# Patient Record
Sex: FEMALE | Race: WHITE | Employment: UNEMPLOYED | URBAN - METROPOLITAN AREA
[De-identification: names, ages, dates, MRNs, and addresses within clinical notes are randomized per-mention and may not be internally consistent; named-entity substitution may affect disease eponyms.]

---

## 2021-04-15 DIAGNOSIS — Z23 ENCOUNTER FOR IMMUNIZATION: ICD-10-CM

## 2023-05-16 ENCOUNTER — TELEPHONE (OUTPATIENT)
Dept: PRIMARY CARE | Facility: CLINIC | Age: 43
End: 2023-05-16
Payer: COMMERCIAL

## 2023-05-16 DIAGNOSIS — F41.9 ANXIETY: Primary | ICD-10-CM

## 2023-05-16 RX ORDER — DULOXETINE 40 MG/1
40 CAPSULE, DELAYED RELEASE ORAL DAILY
Qty: 30 CAPSULE | Refills: 1 | Status: SHIPPED | OUTPATIENT
Start: 2023-05-16 | End: 2023-06-28 | Stop reason: DRUGHIGH

## 2023-05-16 NOTE — TELEPHONE ENCOUNTER
Pt left a msg stating that she saw  Neuro, and they are looking to have her Bupropion changed to Cymbalta, as there can be an interaction between the Bupropion and the Propanolol.  Pharm is [facility]

## 2023-05-17 NOTE — TELEPHONE ENCOUNTER
I spoke with the pt and relayed the msg with understanding.  I asked her to schedule a follow up for 6 weeks.

## 2023-05-19 DIAGNOSIS — G90.A POTS (POSTURAL ORTHOSTATIC TACHYCARDIA SYNDROME): Primary | ICD-10-CM

## 2023-05-22 ENCOUNTER — TELEPHONE (OUTPATIENT)
Dept: PRIMARY CARE | Facility: CLINIC | Age: 43
End: 2023-05-22
Payer: COMMERCIAL

## 2023-05-22 NOTE — TELEPHONE ENCOUNTER
Pt left a msg stating that her Cymbalta needed a prior auth, and was asking if that was started or what the status is.  Pharm is [facility].

## 2023-05-23 RX ORDER — PROPRANOLOL HYDROCHLORIDE 10 MG/1
TABLET ORAL
Qty: 180 TABLET | Refills: 3 | Status: SHIPPED | OUTPATIENT
Start: 2023-05-23 | End: 2024-02-20 | Stop reason: SDUPTHER

## 2023-06-01 NOTE — TELEPHONE ENCOUNTER
I am faxing to you the info you need for the prior auth for the pt's script of Cymbalta/Duloxetine 40mg.

## 2023-06-26 LAB — URINE CULTURE: ABNORMAL

## 2023-06-28 ENCOUNTER — OFFICE VISIT (OUTPATIENT)
Dept: PRIMARY CARE | Facility: CLINIC | Age: 43
End: 2023-06-28
Payer: COMMERCIAL

## 2023-06-28 VITALS
DIASTOLIC BLOOD PRESSURE: 74 MMHG | SYSTOLIC BLOOD PRESSURE: 114 MMHG | WEIGHT: 147.8 LBS | BODY MASS INDEX: 26.18 KG/M2 | TEMPERATURE: 98.1 F | HEART RATE: 85 BPM | OXYGEN SATURATION: 98 %

## 2023-06-28 DIAGNOSIS — G90.A POTS (POSTURAL ORTHOSTATIC TACHYCARDIA SYNDROME): Primary | ICD-10-CM

## 2023-06-28 DIAGNOSIS — F41.9 ANXIETY: ICD-10-CM

## 2023-06-28 PROCEDURE — 1036F TOBACCO NON-USER: CPT | Performed by: STUDENT IN AN ORGANIZED HEALTH CARE EDUCATION/TRAINING PROGRAM

## 2023-06-28 PROCEDURE — 99214 OFFICE O/P EST MOD 30 MIN: CPT | Performed by: STUDENT IN AN ORGANIZED HEALTH CARE EDUCATION/TRAINING PROGRAM

## 2023-06-28 RX ORDER — DULOXETIN HYDROCHLORIDE 60 MG/1
60 CAPSULE, DELAYED RELEASE ORAL DAILY
Qty: 30 CAPSULE | Refills: 1 | Status: SHIPPED | OUTPATIENT
Start: 2023-06-28 | End: 2023-08-17 | Stop reason: SDUPTHER

## 2023-06-28 RX ORDER — NITROFURANTOIN 25; 75 MG/1; MG/1
100 CAPSULE ORAL EVERY 12 HOURS SCHEDULED
COMMUNITY
Start: 2023-06-24 | End: 2023-11-17 | Stop reason: ALTCHOICE

## 2023-06-28 ASSESSMENT — PATIENT HEALTH QUESTIONNAIRE - PHQ9
2. FEELING DOWN, DEPRESSED OR HOPELESS: MORE THAN HALF THE DAYS
SUM OF ALL RESPONSES TO PHQ QUESTIONS 1-9: 14
SUM OF ALL RESPONSES TO PHQ9 QUESTIONS 1 AND 2: 3
10. IF YOU CHECKED OFF ANY PROBLEMS, HOW DIFFICULT HAVE THESE PROBLEMS MADE IT FOR YOU TO DO YOUR WORK, TAKE CARE OF THINGS AT HOME, OR GET ALONG WITH OTHER PEOPLE: VERY DIFFICULT
6. FEELING BAD ABOUT YOURSELF - OR THAT YOU ARE A FAILURE OR HAVE LET YOURSELF OR YOUR FAMILY DOWN: NEARLY EVERY DAY
3. TROUBLE FALLING OR STAYING ASLEEP OR SLEEPING TOO MUCH: NEARLY EVERY DAY
5. POOR APPETITE OR OVEREATING: NOT AT ALL
9. THOUGHTS THAT YOU WOULD BE BETTER OFF DEAD, OR OF HURTING YOURSELF: NOT AT ALL
7. TROUBLE CONCENTRATING ON THINGS, SUCH AS READING THE NEWSPAPER OR WATCHING TELEVISION: SEVERAL DAYS
4. FEELING TIRED OR HAVING LITTLE ENERGY: NEARLY EVERY DAY
1. LITTLE INTEREST OR PLEASURE IN DOING THINGS: SEVERAL DAYS
8. MOVING OR SPEAKING SO SLOWLY THAT OTHER PEOPLE COULD HAVE NOTICED. OR THE OPPOSITE, BEING SO FIGETY OR RESTLESS THAT YOU HAVE BEEN MOVING AROUND A LOT MORE THAN USUAL: SEVERAL DAYS

## 2023-06-28 ASSESSMENT — ENCOUNTER SYMPTOMS
NUMBNESS: 0
WHEEZING: 0
CHILLS: 0
HEMATURIA: 0
DYSPHORIC MOOD: 1
CONSTIPATION: 0
ABDOMINAL PAIN: 0
PALPITATIONS: 0
NAUSEA: 0
DIARRHEA: 0
SHORTNESS OF BREATH: 0
SLEEP DISTURBANCE: 1
NERVOUS/ANXIOUS: 1
DYSURIA: 0
DIZZINESS: 0
FREQUENCY: 0
FEVER: 0
FATIGUE: 0
COUGH: 0
HEADACHES: 0

## 2023-06-28 ASSESSMENT — ANXIETY QUESTIONNAIRES
GAD7 TOTAL SCORE: 11
2. NOT BEING ABLE TO STOP OR CONTROL WORRYING: MORE THAN HALF THE DAYS
7. FEELING AFRAID AS IF SOMETHING AWFUL MIGHT HAPPEN: NOT AT ALL
6. BECOMING EASILY ANNOYED OR IRRITABLE: NEARLY EVERY DAY
4. TROUBLE RELAXING: MORE THAN HALF THE DAYS
1. FEELING NERVOUS, ANXIOUS, OR ON EDGE: MORE THAN HALF THE DAYS
3. WORRYING TOO MUCH ABOUT DIFFERENT THINGS: MORE THAN HALF THE DAYS
5. BEING SO RESTLESS THAT IT IS HARD TO SIT STILL: NOT AT ALL
IF YOU CHECKED OFF ANY PROBLEMS ON THIS QUESTIONNAIRE, HOW DIFFICULT HAVE THESE PROBLEMS MADE IT FOR YOU TO DO YOUR WORK, TAKE CARE OF THINGS AT HOME, OR GET ALONG WITH OTHER PEOPLE: VERY DIFFICULT

## 2023-06-28 NOTE — PATIENT INSTRUCTIONS
We will send in 60 mg of Cymbalta, use [facility] and it should be much cheaper  We will follow-up in 6 weeks  Please take at least 30 minutes a day to do something you enjoy. To try and relieve stress and anxiety, I recommend yoga, stretching, prayer, or meditation. Exercise is a great way to help with this as well.

## 2023-06-28 NOTE — PROGRESS NOTES
Subjective   Patient ID: Missy Ocasio is a 42 y.o. female who presents for Anxiety and Depression (Follow up).    HPI   She never started cymbalta that was recommended by neuro b/c of cost. She isn't on anything right now.   Current medication: Not on anything right now b/c of interaction with propranolol for POTS.   How they feel: She is pretty anxious and depressed. No SI. Problems sleeping. Appetite is good. More emotional and irritable. No racing thoughts. Exhausted. Motivation comes and goes. Feels overwhelmed.   Side effects: n/a  Previous medications: wellbutrin, lexapro, effexor  Self care: trying to read more. Not working out right now.     Not seeing a counselor.     Review of Systems   Constitutional:  Negative for chills, fatigue and fever.   Respiratory:  Negative for cough, shortness of breath and wheezing.    Cardiovascular:  Negative for chest pain, palpitations and leg swelling.   Gastrointestinal:  Negative for abdominal pain, constipation, diarrhea and nausea.   Genitourinary:  Negative for dysuria, frequency, hematuria and urgency.   Neurological:  Negative for dizziness, numbness and headaches.   Psychiatric/Behavioral:  Positive for dysphoric mood and sleep disturbance. The patient is nervous/anxious.        Objective   /74 (BP Location: Left arm, Patient Position: Sitting, BP Cuff Size: Adult)   Pulse 85   Temp 36.7 °C (98.1 °F) (Temporal)   Wt 67 kg (147 lb 12.8 oz)   SpO2 98%   BMI 26.18 kg/m²     Physical Exam  Constitutional:       Appearance: Normal appearance.   HENT:      Head: Normocephalic and atraumatic.   Eyes:      Extraocular Movements: Extraocular movements intact.      Pupils: Pupils are equal, round, and reactive to light.   Cardiovascular:      Rate and Rhythm: Normal rate and regular rhythm.      Heart sounds: Normal heart sounds. No murmur heard.  Pulmonary:      Effort: Pulmonary effort is normal.      Breath sounds: Normal breath sounds. No wheezing.    Abdominal:      General: Bowel sounds are normal.      Palpations: Abdomen is soft.      Tenderness: There is no abdominal tenderness. There is no guarding.   Musculoskeletal:         General: Normal range of motion.   Skin:     General: Skin is warm and dry.   Neurological:      General: No focal deficit present.      Mental Status: She is alert and oriented to person, place, and time.   Psychiatric:         Mood and Affect: Mood normal. Affect is tearful.         Behavior: Behavior normal.         Assessment/Plan   Problem List Items Addressed This Visit       Anxiety     Patient unable to take Wellbutrin with propranolol for her POTS.  We are sending in 60 mg of Cymbalta which she can get relatively cheap with good Rx and we will follow-up in 6 weeks         Relevant Medications    DULoxetine (Cymbalta) 60 mg DR capsule    POTS (postural orthostatic tachycardia syndrome) - Primary     Follows with neurology and is currently on propranolol                 Patient understands and agrees with treatment plan    Irais Marrero DO   06/28/23

## 2023-06-28 NOTE — ASSESSMENT & PLAN NOTE
Patient unable to take Wellbutrin with propranolol for her POTS.  We are sending in 60 mg of Cymbalta which she can get relatively cheap with good [facility] and we will follow-up in 6 weeks

## 2023-08-17 ENCOUNTER — OFFICE VISIT (OUTPATIENT)
Dept: PRIMARY CARE | Facility: CLINIC | Age: 43
End: 2023-08-17
Payer: COMMERCIAL

## 2023-08-17 VITALS
SYSTOLIC BLOOD PRESSURE: 104 MMHG | OXYGEN SATURATION: 98 % | DIASTOLIC BLOOD PRESSURE: 72 MMHG | WEIGHT: 143.2 LBS | BODY MASS INDEX: 25.37 KG/M2 | TEMPERATURE: 97.2 F | HEART RATE: 71 BPM

## 2023-08-17 DIAGNOSIS — Z00.00 HEALTH MAINTENANCE EXAMINATION: ICD-10-CM

## 2023-08-17 DIAGNOSIS — F41.9 ANXIETY: Primary | ICD-10-CM

## 2023-08-17 PROCEDURE — 99213 OFFICE O/P EST LOW 20 MIN: CPT | Performed by: STUDENT IN AN ORGANIZED HEALTH CARE EDUCATION/TRAINING PROGRAM

## 2023-08-17 PROCEDURE — 1036F TOBACCO NON-USER: CPT | Performed by: STUDENT IN AN ORGANIZED HEALTH CARE EDUCATION/TRAINING PROGRAM

## 2023-08-17 RX ORDER — DULOXETIN HYDROCHLORIDE 60 MG/1
60 CAPSULE, DELAYED RELEASE ORAL DAILY
Qty: 90 CAPSULE | Refills: 1 | Status: SHIPPED | OUTPATIENT
Start: 2023-08-17 | End: 2024-02-20 | Stop reason: SDUPTHER

## 2023-08-17 ASSESSMENT — PATIENT HEALTH QUESTIONNAIRE - PHQ9
8. MOVING OR SPEAKING SO SLOWLY THAT OTHER PEOPLE COULD HAVE NOTICED. OR THE OPPOSITE, BEING SO FIGETY OR RESTLESS THAT YOU HAVE BEEN MOVING AROUND A LOT MORE THAN USUAL: NOT AT ALL
10. IF YOU CHECKED OFF ANY PROBLEMS, HOW DIFFICULT HAVE THESE PROBLEMS MADE IT FOR YOU TO DO YOUR WORK, TAKE CARE OF THINGS AT HOME, OR GET ALONG WITH OTHER PEOPLE: SOMEWHAT DIFFICULT
SUM OF ALL RESPONSES TO PHQ9 QUESTIONS 1 AND 2: 2
1. LITTLE INTEREST OR PLEASURE IN DOING THINGS: SEVERAL DAYS
3. TROUBLE FALLING OR STAYING ASLEEP OR SLEEPING TOO MUCH: SEVERAL DAYS
SUM OF ALL RESPONSES TO PHQ QUESTIONS 1-9: 8
7. TROUBLE CONCENTRATING ON THINGS, SUCH AS READING THE NEWSPAPER OR WATCHING TELEVISION: SEVERAL DAYS
5. POOR APPETITE OR OVEREATING: NOT AT ALL
9. THOUGHTS THAT YOU WOULD BE BETTER OFF DEAD, OR OF HURTING YOURSELF: NOT AT ALL
2. FEELING DOWN, DEPRESSED OR HOPELESS: SEVERAL DAYS
4. FEELING TIRED OR HAVING LITTLE ENERGY: MORE THAN HALF THE DAYS
6. FEELING BAD ABOUT YOURSELF - OR THAT YOU ARE A FAILURE OR HAVE LET YOURSELF OR YOUR FAMILY DOWN: MORE THAN HALF THE DAYS

## 2023-08-17 ASSESSMENT — ANXIETY QUESTIONNAIRES
GAD7 TOTAL SCORE: 5
4. TROUBLE RELAXING: SEVERAL DAYS
7. FEELING AFRAID AS IF SOMETHING AWFUL MIGHT HAPPEN: NOT AT ALL
IF YOU CHECKED OFF ANY PROBLEMS ON THIS QUESTIONNAIRE, HOW DIFFICULT HAVE THESE PROBLEMS MADE IT FOR YOU TO DO YOUR WORK, TAKE CARE OF THINGS AT HOME, OR GET ALONG WITH OTHER PEOPLE: VERY DIFFICULT
3. WORRYING TOO MUCH ABOUT DIFFERENT THINGS: NOT AT ALL
5. BEING SO RESTLESS THAT IT IS HARD TO SIT STILL: NOT AT ALL
1. FEELING NERVOUS, ANXIOUS, OR ON EDGE: SEVERAL DAYS
6. BECOMING EASILY ANNOYED OR IRRITABLE: NEARLY EVERY DAY
2. NOT BEING ABLE TO STOP OR CONTROL WORRYING: NOT AT ALL

## 2023-08-17 ASSESSMENT — ENCOUNTER SYMPTOMS
DIZZINESS: 0
HEADACHES: 0
ABDOMINAL PAIN: 0
WHEEZING: 0
FATIGUE: 0
CHILLS: 0
NUMBNESS: 0
CONSTIPATION: 0
FEVER: 0
NERVOUS/ANXIOUS: 0
PALPITATIONS: 0
NAUSEA: 0
DIARRHEA: 0
HEMATURIA: 0
COUGH: 0
FREQUENCY: 0
SHORTNESS OF BREATH: 0
DYSPHORIC MOOD: 0
DYSURIA: 0

## 2023-08-17 NOTE — PROGRESS NOTES
Subjective   Patient ID: Missy Ocasio is a 42 y.o. female who presents for Anxiety (Medication follow up).    HPI   Current medication: Cymbalta 60 mg  How they feel: She feels much better on it. She has to be off of it for autonomic testing so not taking it until Monday. She is less irritable. Improved mood. Less overwhelmed. Sleeping and eating well  Side effects: Had HA the first 4 days but that went away  Previous medications: Lexapro, wellbutrin  Self care: Has been trying to take more time for herself.       Review of Systems   Constitutional:  Negative for chills, fatigue and fever.   Respiratory:  Negative for cough, shortness of breath and wheezing.    Cardiovascular:  Negative for chest pain, palpitations and leg swelling.   Gastrointestinal:  Negative for abdominal pain, constipation, diarrhea and nausea.   Genitourinary:  Negative for dysuria, frequency, hematuria and urgency.   Neurological:  Negative for dizziness, numbness and headaches.   Psychiatric/Behavioral:  Negative for dysphoric mood. The patient is not nervous/anxious.        Objective   /72 (BP Location: Left arm, Patient Position: Sitting, BP Cuff Size: Adult)   Pulse 71   Temp 36.2 °C (97.2 °F) (Temporal)   Wt 65 kg (143 lb 3.2 oz)   SpO2 98%   BMI 25.37 kg/m²     Physical Exam  Constitutional:       Appearance: Normal appearance.   HENT:      Head: Normocephalic and atraumatic.   Eyes:      Extraocular Movements: Extraocular movements intact.      Pupils: Pupils are equal, round, and reactive to light.   Cardiovascular:      Rate and Rhythm: Normal rate and regular rhythm.      Heart sounds: Normal heart sounds. No murmur heard.  Pulmonary:      Effort: Pulmonary effort is normal.      Breath sounds: Normal breath sounds. No wheezing.   Abdominal:      General: Bowel sounds are normal.      Palpations: Abdomen is soft.      Tenderness: There is no abdominal tenderness. There is no guarding.   Musculoskeletal:         General:  Normal range of motion.   Skin:     General: Skin is warm and dry.   Neurological:      General: No focal deficit present.      Mental Status: She is alert and oriented to person, place, and time.   Psychiatric:         Mood and Affect: Mood normal.         Behavior: Behavior normal.         Assessment/Plan   Problem List Items Addressed This Visit       Anxiety - Primary    Relevant Medications    DULoxetine (Cymbalta) 60 mg DR capsule     Other Visit Diagnoses       Health maintenance examination        Relevant Orders    Lipid Panel    Basic Metabolic Panel    CBC          Doing well on 60 mg of Cymbalta.  We will refill this and follow-up in 6 months for physical with fasting blood work prior       Patient understands and agrees with treatment plan    Irais Marrero, DO   08/17/23

## 2023-11-17 ENCOUNTER — ANCILLARY PROCEDURE (OUTPATIENT)
Dept: RADIOLOGY | Facility: CLINIC | Age: 43
End: 2023-11-17
Payer: COMMERCIAL

## 2023-11-17 ENCOUNTER — OFFICE VISIT (OUTPATIENT)
Dept: PRIMARY CARE | Facility: CLINIC | Age: 43
End: 2023-11-17
Payer: COMMERCIAL

## 2023-11-17 VITALS
WEIGHT: 140.6 LBS | HEART RATE: 89 BPM | OXYGEN SATURATION: 97 % | BODY MASS INDEX: 24.91 KG/M2 | SYSTOLIC BLOOD PRESSURE: 106 MMHG | TEMPERATURE: 97.1 F | DIASTOLIC BLOOD PRESSURE: 76 MMHG

## 2023-11-17 DIAGNOSIS — J40 BRONCHITIS: Primary | ICD-10-CM

## 2023-11-17 DIAGNOSIS — M25.551 RIGHT HIP PAIN: ICD-10-CM

## 2023-11-17 DIAGNOSIS — M25.561 CHRONIC PAIN OF RIGHT KNEE: ICD-10-CM

## 2023-11-17 DIAGNOSIS — G89.29 CHRONIC PAIN OF RIGHT KNEE: ICD-10-CM

## 2023-11-17 PROCEDURE — 73502 X-RAY EXAM HIP UNI 2-3 VIEWS: CPT | Mod: RT,FY

## 2023-11-17 PROCEDURE — 99214 OFFICE O/P EST MOD 30 MIN: CPT | Performed by: INTERNAL MEDICINE

## 2023-11-17 PROCEDURE — 73562 X-RAY EXAM OF KNEE 3: CPT | Mod: RIGHT SIDE | Performed by: RADIOLOGY

## 2023-11-17 PROCEDURE — 73502 X-RAY EXAM HIP UNI 2-3 VIEWS: CPT | Mod: RIGHT SIDE | Performed by: RADIOLOGY

## 2023-11-17 PROCEDURE — 73562 X-RAY EXAM OF KNEE 3: CPT | Mod: RT,FY

## 2023-11-17 PROCEDURE — 1036F TOBACCO NON-USER: CPT | Performed by: INTERNAL MEDICINE

## 2023-11-17 RX ORDER — PREDNISONE 50 MG/1
50 TABLET ORAL DAILY
Qty: 5 TABLET | Refills: 0 | Status: SHIPPED | OUTPATIENT
Start: 2023-11-17 | End: 2024-02-19 | Stop reason: ALTCHOICE

## 2023-11-17 RX ORDER — DOXYCYCLINE HYCLATE 100 MG
100 TABLET ORAL 2 TIMES DAILY
COMMUNITY
Start: 2023-11-12 | End: 2024-02-19 | Stop reason: ALTCHOICE

## 2023-11-17 NOTE — PROGRESS NOTES
Subjective   Patient ID: [id number]   [facility]    Missy Ocasio is a 43 y.o. female who presents for Knee Pain and Hip Pain (Right side ). It has been going on for a while, it bothers more whenever she drives for prolonged period. She is having cough and went to urgent care clinic last Sunday, taking doxycycline but still having cough.        Objective   Visit Vitals  /76 (BP Location: Left arm, Patient Position: Sitting, BP Cuff Size: Adult)   Pulse 89   Temp 36.2 °C (97.1 °F) (Skin)      Review of Systems  All 12 systems reviewed, no other abnormality except that mentioned in HPI.    Physical Exam  Constitutional- no abnormality  ENT- no abnormality  Neck- no swelling  CVS- normal S1 and S2, no murmur.  Pulmonary- clear to auscultation,no rhonchi, no wheezes.  Abdomen- normal- liver and spleen, soft, no distension, bowel sound present.  Neurological- all cranial nerves intact, speech and gait normal, no sensory or motor deficiency.  Musculoskeletal- all pulses are normal, normal movement, no joint swelling.  Skin- no rash, dry.  psychiatry- no suicidal ideation.  Lymph node- no lymphadenopathy      Assessment/Plan   Problem List Items Addressed This Visit    None  Visit Diagnoses       Bronchitis    -  Primary    Relevant Medications    predniSONE (Deltasone) 50 mg tablet    Right hip pain        Relevant Orders    XR hip right 2 or 3 views    Referral to Physical Therapy    Chronic pain of right knee                Tran Hernandez MD

## 2023-11-22 ENCOUNTER — TELEPHONE (OUTPATIENT)
Dept: PRIMARY CARE | Facility: CLINIC | Age: 43
End: 2023-11-22
Payer: COMMERCIAL

## 2023-11-22 NOTE — TELEPHONE ENCOUNTER
----- Message from Tran Hernandez MD sent at 11/22/2023 11:20 AM EST -----  Normal x ray of right hip.  ----- Message -----  From: Interface, Radiology Results In  Sent: 11/18/2023   9:12 AM EST  To: Tran Hernandez MD

## 2023-11-22 NOTE — TELEPHONE ENCOUNTER
----- Message from Tran Hernandez MD sent at 11/22/2023 11:21 AM EST -----  Degenerative disease of the knee.  ----- Message -----  From: Interface, Radiology Results In  Sent: 11/18/2023   9:47 PM EST  To: Tran Hernandez MD

## 2023-12-01 ENCOUNTER — ANCILLARY PROCEDURE (OUTPATIENT)
Dept: RADIOLOGY | Facility: CLINIC | Age: 43
End: 2023-12-01
Payer: COMMERCIAL

## 2023-12-01 DIAGNOSIS — R05.3 CHRONIC COUGH: ICD-10-CM

## 2023-12-01 PROCEDURE — 71046 X-RAY EXAM CHEST 2 VIEWS: CPT | Performed by: RADIOLOGY

## 2023-12-01 PROCEDURE — 71046 X-RAY EXAM CHEST 2 VIEWS: CPT

## 2023-12-06 ENCOUNTER — OFFICE VISIT (OUTPATIENT)
Dept: PRIMARY CARE | Facility: CLINIC | Age: 43
End: 2023-12-06
Payer: COMMERCIAL

## 2023-12-06 VITALS
TEMPERATURE: 97.1 F | DIASTOLIC BLOOD PRESSURE: 72 MMHG | WEIGHT: 140.2 LBS | OXYGEN SATURATION: 97 % | BODY MASS INDEX: 24.84 KG/M2 | HEART RATE: 86 BPM | SYSTOLIC BLOOD PRESSURE: 120 MMHG

## 2023-12-06 DIAGNOSIS — J03.90 ACUTE TONSILLITIS, UNSPECIFIED ETIOLOGY: Primary | ICD-10-CM

## 2023-12-06 PROCEDURE — 1036F TOBACCO NON-USER: CPT | Performed by: INTERNAL MEDICINE

## 2023-12-06 PROCEDURE — 99213 OFFICE O/P EST LOW 20 MIN: CPT | Performed by: INTERNAL MEDICINE

## 2023-12-06 RX ORDER — CEPHALEXIN 500 MG/1
500 CAPSULE ORAL 3 TIMES DAILY
Qty: 30 CAPSULE | Refills: 0 | Status: SHIPPED | OUTPATIENT
Start: 2023-12-06 | End: 2023-12-16

## 2023-12-06 RX ORDER — BENZONATATE 200 MG/1
200 CAPSULE ORAL EVERY 8 HOURS
COMMUNITY
Start: 2023-12-01 | End: 2023-12-06

## 2023-12-06 NOTE — PROGRESS NOTES
Subjective   Patient ID: [id number]   [facility]    Missy Ocasio is a 43 y.o. female who presents for Cough (Went to  12-1-23 ). She was taking doxycycline and finished it on before thanks giving.        Objective   Visit Vitals  /72 (BP Location: Left arm, Patient Position: Sitting, BP Cuff Size: Adult)   Pulse 86   Temp 36.2 °C (97.1 °F) (Skin)      Review of Systems  All 12 systems reviewed, no other abnormality except that mentioned in HPI.    Physical Exam  Constitutional- no abnormality  ENT- tonsils are enlarged.  Neck- no swelling  CVS- normal S1 and S2, no murmur.  Pulmonary- clear to auscultation,no rhonchi, no wheezes.  Abdomen- normal- liver and spleen, soft, no distension, bowel sound present.  Neurological- all cranial nerves intact, speech and gait normal, no sensory or motor deficiency.  Musculoskeletal- all pulses are normal, normal movement, no joint swelling.  Skin- no rash, dry.  psychiatry- no suicidal ideation.  Lymph node- no lymphadenopathy      Assessment/Plan   Problem List Items Addressed This Visit    None  Visit Diagnoses       Acute tonsillitis, unspecified etiology    -  Primary    Relevant Medications    cephalexin (Keflex) 500 mg capsule            Tran Hernandez MD

## 2023-12-12 ENCOUNTER — HOSPITAL ENCOUNTER (OUTPATIENT)
Dept: RADIOLOGY | Facility: HOSPITAL | Age: 43
Discharge: HOME | End: 2023-12-12
Payer: COMMERCIAL

## 2023-12-12 DIAGNOSIS — R92.8 OTHER ABNORMAL AND INCONCLUSIVE FINDINGS ON DIAGNOSTIC IMAGING OF BREAST: ICD-10-CM

## 2023-12-12 PROCEDURE — 77065 DX MAMMO INCL CAD UNI: CPT | Mod: LEFT SIDE | Performed by: RADIOLOGY

## 2023-12-12 PROCEDURE — 77061 BREAST TOMOSYNTHESIS UNI: CPT | Mod: LEFT SIDE | Performed by: RADIOLOGY

## 2023-12-12 PROCEDURE — 77065 DX MAMMO INCL CAD UNI: CPT | Mod: LT

## 2024-02-19 NOTE — PROGRESS NOTES
"Missy Ocasio  presents for her annual wellness exam.    HPI  Specialists seen by patient: Neurology  -will be making a gyn appt    Last pap/cervical cancer screenin  Last mammogram: approximate date 2023 and was normal  Hx of colon ca screening:  n/a  Hx of DXA: n/a  LMP/menstrual cycles: regular, has had some spotting midcycle  Contraception: paragard  Menopause: n/a  History of depression or anxiety:yes  Immunizations: up to date  Diet: Follows a healthy diet  Exercise: Not much right now, is going to start yoga  Alcohol abuse screen:   occasional    How many times in the past year 4 or more drinks in a day? once  Lung cancer screening:   Smoking history: never a smoker  Drug use: No    Anxiety and depression F/up  Current medication: Cymbalta 60 mg  How they feel: Doing great  Side effects: decreased sex drive  Previous medications: lexapro, wellbutrin, effexor  Self care: Reading, doing things for herself        Review of Systems   Constitutional:  Negative for chills, fatigue and fever.   Respiratory:  Negative for cough, shortness of breath and wheezing.    Cardiovascular:  Negative for chest pain, palpitations and leg swelling.   Gastrointestinal:  Negative for abdominal pain, constipation, diarrhea and nausea.   Genitourinary:  Negative for dysuria, frequency, hematuria and urgency.   Neurological:  Negative for dizziness, numbness and headaches.   Psychiatric/Behavioral:  Negative for dysphoric mood. The patient is not nervous/anxious.           Objective    /84 (BP Location: Right arm, Patient Position: Sitting, BP Cuff Size: Adult)   Pulse 90   Temp 36.5 °C (97.7 °F) (Temporal)   Ht 1.65 m (5' 4.96\")   Wt 65.3 kg (144 lb)   SpO2 95%   BMI 23.99 kg/m²     Physical Exam  Constitutional:       General: She is not in acute distress.     Appearance: Normal appearance.   HENT:      Head: Normocephalic and atraumatic.      Right Ear: Tympanic membrane and ear canal normal.      Left Ear: " Tympanic membrane and ear canal normal.      Mouth/Throat:      Mouth: Mucous membranes are moist.      Pharynx: No posterior oropharyngeal erythema.   Eyes:      Extraocular Movements: Extraocular movements intact.      Pupils: Pupils are equal, round, and reactive to light.   Cardiovascular:      Rate and Rhythm: Normal rate and regular rhythm.      Heart sounds: No murmur heard.  Pulmonary:      Effort: Pulmonary effort is normal. No respiratory distress.      Breath sounds: Normal breath sounds. No wheezing.   Abdominal:      General: Bowel sounds are normal.      Palpations: Abdomen is soft.      Tenderness: There is no abdominal tenderness. There is no guarding.   Musculoskeletal:         General: Normal range of motion.      Cervical back: Neck supple.   Skin:     General: Skin is warm and dry.   Neurological:      General: No focal deficit present.      Mental Status: She is alert and oriented to person, place, and time.   Psychiatric:         Mood and Affect: Mood normal.         Behavior: Behavior normal.            Problem List Items Addressed This Visit       Anxiety    Relevant Medications    DULoxetine (Cymbalta) 60 mg DR capsule    POTS (postural orthostatic tachycardia syndrome)    Relevant Medications    propranolol (Inderal) 10 mg tablet     Other Visit Diagnoses       Health maintenance examination    -  Primary    Bilateral impacted cerumen        Relevant Orders    Ear Cerumen Removal    Tinnitus of left ear        Relevant Orders    Referral to ENT             We will obtain fasting blood work.  Results will be communicated to the patient via MyChart or a letter.   We reviewed appropriate preventative health screening guidelines. The patient is  up-to-date on vaccinations, recommended n/a.    We discussed regular aerobic exercise. We discussed proper nutrition and weight control.      Irais Marrero, DO  02/20/24

## 2024-02-20 ENCOUNTER — OFFICE VISIT (OUTPATIENT)
Dept: PRIMARY CARE | Facility: CLINIC | Age: 44
End: 2024-02-20
Payer: COMMERCIAL

## 2024-02-20 VITALS
TEMPERATURE: 97.7 F | SYSTOLIC BLOOD PRESSURE: 120 MMHG | BODY MASS INDEX: 23.99 KG/M2 | HEIGHT: 65 IN | DIASTOLIC BLOOD PRESSURE: 84 MMHG | HEART RATE: 90 BPM | OXYGEN SATURATION: 95 % | WEIGHT: 144 LBS

## 2024-02-20 DIAGNOSIS — H93.12 TINNITUS OF LEFT EAR: ICD-10-CM

## 2024-02-20 DIAGNOSIS — F41.9 ANXIETY: ICD-10-CM

## 2024-02-20 DIAGNOSIS — G90.A POTS (POSTURAL ORTHOSTATIC TACHYCARDIA SYNDROME): ICD-10-CM

## 2024-02-20 DIAGNOSIS — Z00.00 HEALTH MAINTENANCE EXAMINATION: Primary | ICD-10-CM

## 2024-02-20 DIAGNOSIS — H61.23 BILATERAL IMPACTED CERUMEN: ICD-10-CM

## 2024-02-20 PROCEDURE — 99213 OFFICE O/P EST LOW 20 MIN: CPT | Performed by: STUDENT IN AN ORGANIZED HEALTH CARE EDUCATION/TRAINING PROGRAM

## 2024-02-20 PROCEDURE — 1036F TOBACCO NON-USER: CPT | Performed by: STUDENT IN AN ORGANIZED HEALTH CARE EDUCATION/TRAINING PROGRAM

## 2024-02-20 PROCEDURE — 69209 REMOVE IMPACTED EAR WAX UNI: CPT | Performed by: STUDENT IN AN ORGANIZED HEALTH CARE EDUCATION/TRAINING PROGRAM

## 2024-02-20 PROCEDURE — 99396 PREV VISIT EST AGE 40-64: CPT | Performed by: STUDENT IN AN ORGANIZED HEALTH CARE EDUCATION/TRAINING PROGRAM

## 2024-02-20 RX ORDER — DULOXETIN HYDROCHLORIDE 60 MG/1
60 CAPSULE, DELAYED RELEASE ORAL DAILY
Qty: 90 CAPSULE | Refills: 3 | Status: SHIPPED | OUTPATIENT
Start: 2024-02-20

## 2024-02-20 RX ORDER — PROPRANOLOL HYDROCHLORIDE 10 MG/1
10 TABLET ORAL 2 TIMES DAILY
Qty: 180 TABLET | Refills: 3 | Status: SHIPPED | OUTPATIENT
Start: 2024-02-20 | End: 2024-05-24

## 2024-02-20 ASSESSMENT — ENCOUNTER SYMPTOMS
FATIGUE: 0
COUGH: 0
PALPITATIONS: 0
NUMBNESS: 0
CONSTIPATION: 0
DYSURIA: 0
NAUSEA: 0
CHILLS: 0
HEADACHES: 0
FEVER: 0
NERVOUS/ANXIOUS: 0
DIZZINESS: 0
DYSPHORIC MOOD: 0
WHEEZING: 0
HEMATURIA: 0
SHORTNESS OF BREATH: 0
ABDOMINAL PAIN: 0
FREQUENCY: 0
DIARRHEA: 0

## 2024-02-20 ASSESSMENT — PATIENT HEALTH QUESTIONNAIRE - PHQ9
1. LITTLE INTEREST OR PLEASURE IN DOING THINGS: NOT AT ALL
2. FEELING DOWN, DEPRESSED OR HOPELESS: NOT AT ALL
SUM OF ALL RESPONSES TO PHQ9 QUESTIONS 1 AND 2: 0

## 2024-02-20 NOTE — PROGRESS NOTES
Patient ID: Missy Ocasio is a 43 y.o. female.    Ear Cerumen Removal    Date/Time: 2/20/2024 2:05 PM    Performed by: Irais Marrero DO  Authorized by: Irais Marrero DO    Consent:     Consent obtained:  Verbal    Consent given by:  Patient    Risks, benefits, and alternatives were discussed: yes      Risks discussed:  Bleeding, dizziness, incomplete removal, infection, pain and TM perforation  Universal protocol:     Patient identity confirmed:  Verbally with patient  Procedure details:     Location:  R ear and L ear    Procedure type: irrigation      Procedure outcomes: cerumen removed    Post-procedure details:     Inspection:  TM intact    Hearing quality:  Improved    Procedure completion:  Tolerated

## 2024-02-27 ENCOUNTER — OFFICE VISIT (OUTPATIENT)
Dept: NEUROLOGY | Facility: CLINIC | Age: 44
End: 2024-02-27
Payer: COMMERCIAL

## 2024-02-27 VITALS
WEIGHT: 143 LBS | DIASTOLIC BLOOD PRESSURE: 77 MMHG | SYSTOLIC BLOOD PRESSURE: 110 MMHG | HEART RATE: 89 BPM | HEIGHT: 64 IN | BODY MASS INDEX: 24.41 KG/M2

## 2024-02-27 DIAGNOSIS — G90.A POTS (POSTURAL ORTHOSTATIC TACHYCARDIA SYNDROME): Primary | ICD-10-CM

## 2024-02-27 PROCEDURE — 1036F TOBACCO NON-USER: CPT | Performed by: SPECIALIST

## 2024-02-27 PROCEDURE — 99213 OFFICE O/P EST LOW 20 MIN: CPT | Performed by: SPECIALIST

## 2024-02-27 ASSESSMENT — ANXIETY QUESTIONNAIRES
7. FEELING AFRAID AS IF SOMETHING AWFUL MIGHT HAPPEN: NOT AT ALL
6. BECOMING EASILY ANNOYED OR IRRITABLE: NOT AT ALL
IF YOU CHECKED OFF ANY PROBLEMS ON THIS QUESTIONNAIRE, HOW DIFFICULT HAVE THESE PROBLEMS MADE IT FOR YOU TO DO YOUR WORK, TAKE CARE OF THINGS AT HOME, OR GET ALONG WITH OTHER PEOPLE: NOT DIFFICULT AT ALL
5. BEING SO RESTLESS THAT IT IS HARD TO SIT STILL: NOT AT ALL
4. TROUBLE RELAXING: NOT AT ALL
1. FEELING NERVOUS, ANXIOUS, OR ON EDGE: NOT AT ALL
GAD7 TOTAL SCORE: 0
3. WORRYING TOO MUCH ABOUT DIFFERENT THINGS: NOT AT ALL
2. NOT BEING ABLE TO STOP OR CONTROL WORRYING: NOT AT ALL

## 2024-02-27 ASSESSMENT — ENCOUNTER SYMPTOMS
LOSS OF SENSATION IN FEET: 0
DEPRESSION: 0
OCCASIONAL FEELINGS OF UNSTEADINESS: 0

## 2024-02-27 NOTE — PROGRESS NOTES
"The patient was seen in my office for further evaluation of POTS . Since seenl ast she has been doing well, with         - Propranolol 10 mg twice daily ( well tolerated with no side effects).   - Increased salt, fluid intake and use of liquid IV electrolyte powder  - Compression stockings      She has not been experiencing any major symptoms of POTS lately and she is happy about her condition.    She has been treating her anxiety/depression with Cymbalta 60 mg po daily ( well tolerated with no side effects).      Current Outpatient Medications on File Prior to Visit   Medication Sig Dispense Refill    DULoxetine (Cymbalta) 60 mg DR capsule Take 1 capsule (60 mg) by mouth once daily. Do not crush or chew. 90 capsule 3    multivit-minerals/folic acid (MULTIVITAMIN GUMMIES ORAL) Take by mouth.      propranolol (Inderal) 10 mg tablet Take 1 tablet (10 mg) by mouth 2 times a day. 180 tablet 3     No current facility-administered medications on file prior to visit.     Exam:   /77   Pulse 89   Ht 1.626 m (5' 4\")   Wt 64.9 kg (143 lb)   BMI 24.55 kg/m²   In no active distress, at baseline.       Patient Active Problem List   Diagnosis    Anxiety    POTS (postural orthostatic tachycardia syndrome)     Assessment and plan:  Postural Orthostatic Tachycardia Syndrome (POTS) , currently resolved  Management:  - Acknowledge Missy's reported improvement in POTS symptoms with the use of compression socks and medication, noting no new symptoms or exacerbations.  - Continue the prescribed Propranolol at 10 mg twice daily and Cymbalta at 60 mg daily, recognizing their benefits for both POTS and mood symptoms.  - Encourage Missy to monitor her heart rate and blood pressure regularly.  - Discuss the possibility of increasing the  Cymbalta dosage in the future if symptoms of POTS worsen, as an alternative to increasing propranolol.  - Advise Missy on the importance of managing salt and fluid intake, particularly with " seasonal changes.  - No additional testing is deemed necessary at this follow-up.  - Schedule follow-ups as needed based on symptomatology and treatment response.        Cassidy Chang M.D.    Time spent 20 minutes including reviewing medical records includes examining, consulting the patient and discussing .

## 2024-04-04 ENCOUNTER — OFFICE VISIT (OUTPATIENT)
Dept: OTOLARYNGOLOGY | Facility: CLINIC | Age: 44
End: 2024-04-04
Payer: COMMERCIAL

## 2024-04-04 VITALS — WEIGHT: 140 LBS | BODY MASS INDEX: 24.03 KG/M2

## 2024-04-04 DIAGNOSIS — M26.609 TEMPOROMANDIBULAR JOINT DISORDER: Primary | ICD-10-CM

## 2024-04-04 DIAGNOSIS — J34.2 DEVIATED SEPTUM: ICD-10-CM

## 2024-04-04 DIAGNOSIS — J31.0 CHRONIC RHINITIS: ICD-10-CM

## 2024-04-04 DIAGNOSIS — H93.13 TINNITUS OF BOTH EARS: ICD-10-CM

## 2024-04-04 DIAGNOSIS — J35.8 TONSIL STONE: ICD-10-CM

## 2024-04-04 PROCEDURE — 1036F TOBACCO NON-USER: CPT | Performed by: GENERAL PRACTICE

## 2024-04-04 PROCEDURE — 99204 OFFICE O/P NEW MOD 45 MIN: CPT | Performed by: GENERAL PRACTICE

## 2024-04-04 RX ORDER — FLUTICASONE PROPIONATE 50 MCG
2 SPRAY, SUSPENSION (ML) NASAL DAILY
Qty: 16 G | Refills: 2 | Status: SHIPPED | OUTPATIENT
Start: 2024-04-04 | End: 2024-06-04

## 2024-04-04 ASSESSMENT — PATIENT HEALTH QUESTIONNAIRE - PHQ9
SUM OF ALL RESPONSES TO PHQ9 QUESTIONS 1 AND 2: 0
1. LITTLE INTEREST OR PLEASURE IN DOING THINGS: NOT AT ALL
2. FEELING DOWN, DEPRESSED OR HOPELESS: NOT AT ALL

## 2024-04-04 NOTE — PROGRESS NOTES
Otolaryngology - Head and Neck Surgery Outpatient New Patient Visit Note        Assessment/Plan   Problem List Items Addressed This Visit    None  Visit Diagnoses         Codes    Temporomandibular joint disorder    -  Primary M26.609    Deviated septum     J34.2    Tinnitus of both ears     H93.13    Relevant Orders    Referral to Audiology    Chronic rhinitis     J31.0    Relevant Medications    fluticasone (Flonase) 50 mcg/actuation nasal spray    Tonsil stone     J35.8            43yoF with tinnitus in the setting of TMJ.  Uncertain hearing loss per patient.  No otitis on exam.  Some rhinitis which may be contributing to ETD.  Will control for rhinitis with flonase.   Will acquire audiogram.    Discussed conservative tinnitus management  Discussed conservative TMJ management.     Some tonsil calculi, discussed conservative management and controls for irritants such as PND.  Consider future tonsillectomy if persists.      The etiology of temporomandibular joint disorders (TMD) was discussed in detail with patient including: structural issues such as alterations in dental occlusion (the alignment of the upper and lower teeth) that affect maxillomandibular position and function. These issues may or may not be associated with joint trauma, poor posture or cervicogenic etiologies.  Possible psychologic factors include anxiety, depression and PTSD. Multiple other contributing and comorbid factors, including biological, behavioral, environmental, and cognitive disorders which can all contribute to the development of symptoms were also reviewed with the patient.      PLAN:  1) Lifestyle interventions for management of TMJ dysfunction were discussed with the patient today including use of warm compresses, massage of the TMJ joint, and/or use of NSAIDS PRN for pain and intimation and soft chew diet.      Additional options for further evaluation and management of TMD were discussed with the patient today and may include  the following referrals upon patient request:  1) Referral to physical therapy for further evaluation and management of cervicogenic/ postural related issues.  2) Referral to [facility] for evaluation and management with alternative therapeutic modalities such as massage or acupuncture.   3) Referral to dental medicine for further evaluation and management of structural/ mechanical concerns such as malocclusion or bruxism.     Follow up:  -plan for follow up in clinic as needed and after completion of ordered studies    All of the above findings, impressions, treatment planning and follow up plans were discussed with the patient who indicated understanding.  the patient was instructed to contact or return to clinic sooner if symptoms/signs persist or worsen despite the above management.      Colton Azevedo MD  Otolaryngology - Head and Neck Surgery            History Of Present Illness  Missy Ocasio is a 43 y.o. female presenting for evaluation of tinnitus     Reports tinnitus which is more notable in the last few months.   Uncertain if hearing loss.         The paitent reports a history of intermittent, waxing/waning, nonpulsatile, tonal tinnitus which does not interfere with hearing.   The patient denies a history of significant noise exposure due to occupational exposures, industrial noise, etc.     The patient denies sudden changes in hearing.  The patient denies otalgia, otorrhea, vertigo, or facial weakness.    The patient denies a history of otologic surgery or trauma.  The patient denies a history of blast injury, TBI or concussion associated with hearing loss.  The patient denies a family history of significant hearing loss.  The patient reports a history of AOM as a child, but no recent significant history of ear infection.  No reported exposure to known ototoxins (chemotherapeutics, aminoglycosides, loop diurectics, high dose NSAIDs).   No reported history of radiation treatment to the  head/neck.       Reports a history of some chronic congestion, worse in summer with allergic rhinitsi.  Takes claritin PRN.     Reports a history of intermittent tonsil enlargement with URIs causing some dysphagia/odynophagia.   Reports intermittent tonsil stones causing mild irritation.     Reports a history of TMJ.  Uses a bite block at night with some relief.          Past Medical History  She has a past medical history of Anxiety and Depression.    Surgical History  She has a past surgical history that includes [place] tooth extraction.     Social History  She reports that she has never smoked. She has never been exposed to tobacco smoke. She has never used smokeless tobacco. She reports current alcohol use of about 4.0 standard drinks of alcohol per week. She reports that she does not use drugs.    Family History  Family History   Problem Relation Name Age of Onset    Lymphoma Mother      No Known Problems Father      Other (cardiac disorder) Maternal Grandmother      Other (cva) Maternal Grandmother      Other (cardiac disorder) Maternal Grandfather      Other (malignant neoplasm) Paternal Grandfather          Allergies  Patient has no known allergies.    Review of Systems  ROS: Pertinent positives as noted in HPI.    - CONSTITUTIONAL: Does not report weight loss, fever or chills.    - HEENT:   Ear: Does not report  , vertigo, hearing loss, otalgia, otorrhea  Nose: Does not report congestion, rhinorrhea, epistaxis, decreased smell  Throat: Does not report pain, dysphagia, odynophagia  Larynx: Does not report hoarseness,  difficulty breathing, pain with speaking (odynophonia)  Neck: Does not report new masses, pain, swelling  Face: Does not report sinus pain, pressure, swelling, numbness, weakness     - RESPIRATORY: Does not report SOB or cough.    - CV: Does not report palpitations or chest pain.     - GI: Does not report abdominal pain, nausea, vomiting or diarrhea.    - : Does not report dysuria or urinary  frequency.    - MSK: Does not report myalgia or joint pain.    - SKIN: Does not report rash or pruritus.    - NEUROLOGICAL: Does not report headache or syncope.    - PSYCHIATRIC: Does not report recent changes in mood. Does not report anxiety or depression.         Physical Exam:     GENERAL:   Alert & Oriented to person, place and time; Normal affect and appearance. Well developed and well nourished. Conversant & cooperative with examination.     HEAD:   Normocephalic, atraumatic. No sinus tenderness to palpation. Normal parotid bilaterally. Normal facial strength.     NEUROLOGIC:   Cranial nerves II-XII grossly intact, gait WNL. Normal mood and affect.    EYES:   Extraocular movements intact. Pupils equal, round, reactive to light and accommodation. No nystagmus, no ptosis. no scleral injection.    EAR:   Normal auricle. No discomfort or TTP with manipulation.   Handheld otoscopic exam showed normal external auditory canals bilaterally. No purulence or EAC inflammation. Minimal cerumen.   Right tympanic membrane clear and mobile without evidence of perforation, retraction or middle ear effusion.   Left tympanic membrane clear and mobile without evidence of perforation, retraction or middle ear effusion.     NOSE:   No external deformity. No external nasal lesions, lacerations, or scars. Nasal tip symmetrical with normal nasal valves.   Nasal cavity with leftward septum, edematous  mucosa and turbinates. No lesions, masses, purulence or polyps.     OC/OP:   Mucous membranes moist, no masses, lesions or exudates.   Normal tongue, floor of mouth, teeth, gums, lips. Normal posterior pharyngeal wall.    Normal 1+ tonsils without erythema, exudate or obvious calculi     NECK:   No neck masses or thyroid enlargement. Trachea midline. No tenderness to palpation    LYMPHATIC:   No cervical lymphadenopathy.     RESPIRATORY:   Symmetric chest elevation & no retractions. No significant hoarseness. No increased work of  breathing.    CV:   No clubbing or cyanosis. No obvious edema    Skin:   No facial rashes, vesicles or lesions.     Extremities:   No gross abnormalities      Clinic Procedure        Information review:  External sources (notes, imaging, lab results) listed below personally reviewed to aid in medical decision making process.  -  -  -

## 2024-04-20 ENCOUNTER — LAB (OUTPATIENT)
Dept: LAB | Facility: LAB | Age: 44
End: 2024-04-20
Payer: COMMERCIAL

## 2024-04-20 DIAGNOSIS — Z00.00 HEALTH MAINTENANCE EXAMINATION: ICD-10-CM

## 2024-04-20 PROCEDURE — 80048 BASIC METABOLIC PNL TOTAL CA: CPT

## 2024-04-20 PROCEDURE — 80061 LIPID PANEL: CPT

## 2024-04-20 PROCEDURE — 85027 COMPLETE CBC AUTOMATED: CPT

## 2024-04-20 PROCEDURE — 36415 COLL VENOUS BLD VENIPUNCTURE: CPT

## 2024-04-21 LAB
ANION GAP SERPL CALC-SCNC: 10 MMOL/L (ref 10–20)
BUN SERPL-MCNC: 9 MG/DL (ref 6–23)
CALCIUM SERPL-MCNC: 8.9 MG/DL (ref 8.6–10.6)
CHLORIDE SERPL-SCNC: 105 MMOL/L (ref 98–107)
CHOLEST SERPL-MCNC: 164 MG/DL (ref 0–199)
CHOLESTEROL/HDL RATIO: 2.4
CO2 SERPL-SCNC: 29 MMOL/L (ref 21–32)
CREAT SERPL-MCNC: 0.66 MG/DL (ref 0.5–1.05)
EGFRCR SERPLBLD CKD-EPI 2021: >90 ML/MIN/1.73M*2
ERYTHROCYTE [DISTWIDTH] IN BLOOD BY AUTOMATED COUNT: 12.4 % (ref 11.5–14.5)
GLUCOSE SERPL-MCNC: 87 MG/DL (ref 74–99)
HCT VFR BLD AUTO: 40.9 % (ref 36–46)
HDLC SERPL-MCNC: 67.1 MG/DL
HGB BLD-MCNC: 13.4 G/DL (ref 12–16)
LDLC SERPL CALC-MCNC: 83 MG/DL
MCH RBC QN AUTO: 30.5 PG (ref 26–34)
MCHC RBC AUTO-ENTMCNC: 32.8 G/DL (ref 32–36)
MCV RBC AUTO: 93 FL (ref 80–100)
NON HDL CHOLESTEROL: 97 MG/DL (ref 0–149)
NRBC BLD-RTO: 0 /100 WBCS (ref 0–0)
PLATELET # BLD AUTO: 286 X10*3/UL (ref 150–450)
POTASSIUM SERPL-SCNC: 4.5 MMOL/L (ref 3.5–5.3)
RBC # BLD AUTO: 4.39 X10*6/UL (ref 4–5.2)
SODIUM SERPL-SCNC: 139 MMOL/L (ref 136–145)
TRIGL SERPL-MCNC: 68 MG/DL (ref 0–149)
VLDL: 14 MG/DL (ref 0–40)
WBC # BLD AUTO: 5.7 X10*3/UL (ref 4.4–11.3)

## 2024-05-21 DIAGNOSIS — G90.A POTS (POSTURAL ORTHOSTATIC TACHYCARDIA SYNDROME): ICD-10-CM

## 2024-05-24 RX ORDER — PROPRANOLOL HYDROCHLORIDE 10 MG/1
10 TABLET ORAL 2 TIMES DAILY
Qty: 180 TABLET | Refills: 3 | Status: SHIPPED | OUTPATIENT
Start: 2024-05-24

## 2024-06-04 ENCOUNTER — OFFICE VISIT (OUTPATIENT)
Dept: OBSTETRICS AND GYNECOLOGY | Facility: CLINIC | Age: 44
End: 2024-06-04
Payer: COMMERCIAL

## 2024-06-04 VITALS
BODY MASS INDEX: 24.92 KG/M2 | SYSTOLIC BLOOD PRESSURE: 118 MMHG | DIASTOLIC BLOOD PRESSURE: 73 MMHG | HEIGHT: 64 IN | WEIGHT: 146 LBS

## 2024-06-04 DIAGNOSIS — R10.30 LOWER ABDOMINAL PAIN: ICD-10-CM

## 2024-06-04 DIAGNOSIS — N80.9 ENDOMETRIOSIS: ICD-10-CM

## 2024-06-04 DIAGNOSIS — Z01.419 WELL WOMAN EXAM: Primary | ICD-10-CM

## 2024-06-04 PROCEDURE — 99396 PREV VISIT EST AGE 40-64: CPT | Performed by: NURSE PRACTITIONER

## 2024-06-04 PROCEDURE — 87624 HPV HI-RISK TYP POOLED RSLT: CPT | Performed by: NURSE PRACTITIONER

## 2024-06-04 PROCEDURE — 1036F TOBACCO NON-USER: CPT | Performed by: NURSE PRACTITIONER

## 2024-06-04 ASSESSMENT — ENCOUNTER SYMPTOMS
CONSTITUTIONAL NEGATIVE: 1
OCCASIONAL FEELINGS OF UNSTEADINESS: 0
RESPIRATORY NEGATIVE: 0
GASTROINTESTINAL NEGATIVE: 0
NEUROLOGICAL NEGATIVE: 0
HEMATOLOGIC/LYMPHATIC NEGATIVE: 0
DEPRESSION: 0
ALLERGIC/IMMUNOLOGIC NEGATIVE: 0
RESPIRATORY NEGATIVE: 1
EYES NEGATIVE: 0
CONSTITUTIONAL NEGATIVE: 0
HEMATOLOGIC/LYMPHATIC NEGATIVE: 1
ABDOMINAL PAIN: 1
PSYCHIATRIC NEGATIVE: 0
MUSCULOSKELETAL NEGATIVE: 1
CARDIOVASCULAR NEGATIVE: 0
NEUROLOGICAL NEGATIVE: 1
DIFFICULTY URINATING: 1
EYES NEGATIVE: 1
MUSCULOSKELETAL NEGATIVE: 0
CARDIOVASCULAR NEGATIVE: 1
ENDOCRINE NEGATIVE: 0
LOSS OF SENSATION IN FEET: 0
ALLERGIC/IMMUNOLOGIC NEGATIVE: 1
PSYCHIATRIC NEGATIVE: 1

## 2024-06-04 ASSESSMENT — LIFESTYLE VARIABLES
HOW OFTEN DO YOU HAVE A DRINK CONTAINING ALCOHOL: NEVER
SKIP TO QUESTIONS 9-10: 0
HOW OFTEN DO YOU HAVE SIX OR MORE DRINKS ON ONE OCCASION: LESS THAN MONTHLY
AUDIT-C TOTAL SCORE: 0
HOW MANY STANDARD DRINKS CONTAINING ALCOHOL DO YOU HAVE ON A TYPICAL DAY: PATIENT DOES NOT DRINK
SKIP TO QUESTIONS 9-10: 1
AUDIT-C TOTAL SCORE: 4
HOW OFTEN DO YOU HAVE A DRINK CONTAINING ALCOHOL: 2-3 TIMES A WEEK
HOW OFTEN DO YOU HAVE SIX OR MORE DRINKS ON ONE OCCASION: NEVER
HOW MANY STANDARD DRINKS CONTAINING ALCOHOL DO YOU HAVE ON A TYPICAL DAY: 1 OR 2

## 2024-06-04 ASSESSMENT — PAIN SCALES - GENERAL: PAINLEVEL: 0-NO PAIN

## 2024-06-04 NOTE — PROGRESS NOTES
"Haven Rodriguez, APRN-CNP     Subjective   Missy Ocasio is a 43 y.o. female who presents for annual exam.   She has a known history of mild endometriosis.    She has been experiencing an increase in night sweats that seem to be more prevalent with her menstrual cycle.  She also has had spotting midcycle along with lower pelvic pain.  The pain causes her lower abdomen to feel bloated and hard and and is very painful to the touch.  This does not seem to be associated with any change in bowel or bladder.  Past Medical History:   Diagnosis Date    Abnormal Pap smear of cervix     Anxiety     CTS (carpal tunnel syndrome)     Depression     Endometriosis 2015    Female infertility 2013    Migraine 2009     Past Surgical History:   Procedure Laterality Date    COLPOSCOPY      EXPLORATORY LAPAROTOMY      WISDOM TOOTH EXTRACTION         OB History          3    Para   2    Term                AB        Living             SAB        IAB        Ectopic        Multiple        Live Births                   Patient's last menstrual period was 2024.      Review of Systems   Constitutional: Negative.    HENT: Negative.     Eyes: Negative.    Respiratory: Negative.     Cardiovascular: Negative.    Gastrointestinal:  Positive for abdominal pain.   Endocrine: Positive for heat intolerance.   Genitourinary:  Positive for difficulty urinating, menstrual problem, pelvic pain and vaginal discharge.   Musculoskeletal: Negative.    Skin: Negative.    Allergic/Immunologic: Negative.    Neurological: Negative.    Hematological: Negative.    Psychiatric/Behavioral: Negative.     All other systems reviewed and are negative.    Breast: No Complaints   Vaginal: No Complaints        Objective   /73   Ht 1.626 m (5' 4\")   Wt 66.2 kg (146 lb)   LMP 2024   BMI 25.06 kg/m²   Physical Exam  Constitutional:       Appearance: Normal appearance.   Genitourinary:      Vulva and rectum normal.      Right " Labia: No rash or tenderness.     Left Labia: No tenderness or rash.     No vaginal discharge or ulceration.      No vaginal prolapse present.     No vaginal atrophy present.       Right Adnexa: not tender.     Left Adnexa: not tender.     Cervical motion tenderness present.   Breasts:     Right: Normal.      Left: Normal.   Cardiovascular:      Rate and Rhythm: Normal rate.   Pulmonary:      Effort: Pulmonary effort is normal.      Breath sounds: Normal breath sounds.   Abdominal:          Comments: Area of pain located as above   Musculoskeletal:         General: Normal range of motion.   Neurological:      General: No focal deficit present.      Mental Status: She is alert.   Skin:     General: Skin is warm and dry.   Vitals and nursing note reviewed.                 Assessment/Plan   Problem List Items Addressed This Visit    None  Visit Diagnoses         Codes    Well woman exam    -  Primary Z01.419    Relevant Orders    THINPREP PAP TEST    Lower abdominal pain     R10.30    Relevant Orders    US PELVIS TRANSABDOMINAL WITH TRANSVAGINAL        Provider will notify patient after results of pelvic ultrasound are back.  Would consider treatment of endometriosis possibly Myfembree or Orilissa.

## 2024-06-05 ENCOUNTER — HOSPITAL ENCOUNTER (OUTPATIENT)
Dept: RADIOLOGY | Facility: CLINIC | Age: 44
Discharge: HOME | End: 2024-06-05
Payer: COMMERCIAL

## 2024-06-05 DIAGNOSIS — R10.30 LOWER ABDOMINAL PAIN: ICD-10-CM

## 2024-06-05 PROCEDURE — 76856 US EXAM PELVIC COMPLETE: CPT | Performed by: STUDENT IN AN ORGANIZED HEALTH CARE EDUCATION/TRAINING PROGRAM

## 2024-06-05 PROCEDURE — 76856 US EXAM PELVIC COMPLETE: CPT

## 2024-06-05 PROCEDURE — 76830 TRANSVAGINAL US NON-OB: CPT | Performed by: STUDENT IN AN ORGANIZED HEALTH CARE EDUCATION/TRAINING PROGRAM

## 2024-06-07 RX ORDER — RELUGOLIX, ESTRADIOL HEMIHYDRATE, AND NORETHINDRONE ACETATE 40; 1; .5 MG/1; MG/1; MG/1
1 TABLET, FILM COATED ORAL DAILY
Qty: 90 TABLET | Refills: 0 | Status: SHIPPED | OUTPATIENT
Start: 2024-06-07

## 2024-06-10 ENCOUNTER — CLINICAL SUPPORT (OUTPATIENT)
Dept: AUDIOLOGY | Facility: CLINIC | Age: 44
End: 2024-06-10
Payer: COMMERCIAL

## 2024-06-10 DIAGNOSIS — H93.13 SUBJECTIVE TINNITUS OF BOTH EARS: ICD-10-CM

## 2024-06-10 PROCEDURE — 92557 COMPREHENSIVE HEARING TEST: CPT

## 2024-06-10 PROCEDURE — 92567 TYMPANOMETRY: CPT

## 2024-06-10 NOTE — PROGRESS NOTES
AUDIOLOGY ADULT AUDIOMETRIC EVALUATION      Name:  Missy Ocasio   :  1980  Age:  43 y.o.  Date of Evaluation:  6/10/2024    Time: 6303-8024    IMPRESSIONS     Hearing sensitivity within normal limits for 125-8000 Hz in both ears.  DPOAE responses essentially present in both ears.  Word understanding in quiet is excellent in both ears.  Tympanometry indicates normal middle ear pressure and tympanic membrane mobility in both ears.     Today's test results are normal hearing sensitivity.     Amplification needs: Amplification is not warranted at this time.    RECOMMENDATIONS     Continue medical follow up with primary care provider and/or Ears Nose and Throat (ENT) provider as recommended.  Return for audiologic evaluation in conjunction with medical management to monitor hearing sensitivity and assess middle ear status or sooner should concerns arise. The audiology department can be reached at [phone] to schedule an appointment.   Avoid exposure to loud sounds by moving away from the noise, turning down the volume, or wearing proper hearing protection correctly.  Consider use of tinnitus management options, which include but are not limited to the following: sound therapy (use of pleasant or calming sounds that diminish the presence of tinnitus); mindfulness, meditation, and breathing exercises; sleep hygiene; mental health evaluation and formal therapies; psychiatric management of psychopharmaceuticals; dental/orthodontia evaluation; dietary changes (reduction of sodium, caffeine, alcohol); lifestyle changes (exercise, etc.); use of hearing protection and avoidance of loud noise; and formal tinnitus therapies (Tinnitus Retraining Therapy/ TRT, Progressive Tinnitus Management, Tinnitus Activities Treatment, Cognitive Behavioral Therapy).    HISTORY     Reason for visit:  Ms. Ocasio is seen today for an initial audiologic evaluation at the request of Colton Azevedo MD due to tinnitus in both ears, and ear  "itching. History obtained from patient report and chart review.     Change in Hearing: denied  Tinnitus: yes in both ears constant, bothersome, non-pulsatile, and described as ringing/cicada sensation  Otalgia: denied; endorses bilateral ear itching   Aural Pressure/Fullness: denied  Recent Ear Infections/Illness: denied  Otorrhea: denied  Dizziness: denied  History of Ear Surgeries: denied  History of Noise Exposure: denied  Family History of Hearing Loss: Denied  Hearing Aid Use: None  Other Significant History: denied  Falls within the last year: denied    EVALUATION     See scanned audiogram in \"Media\"     TEST RESULTS     Otoscopic Evaluation:  Right Ear: Ear canal clear with identifiable cone of light.  Left Ear: Ear canal clear with identifiable cone of light.    Tympanometry (226 Hz):  Right Ear: Type A, middle ear pressure and tympanic membrane compliance within normal limits.   Left Ear: Type A, middle ear pressure and tympanic membrane compliance within normal limits.     Acoustic Reflexes:   Right Ear: Screened at 1000 Hz, response present.   Left Ear: Screened at 1000 Hz, response present.     Distortion Product Otoacoustic Emissions:  Right Ear: Essentially present. Responses present at 4028-6765 Hz. Response(s) absent at 9070-9812 Hz.  Left Ear:  Essentially present. Responses present at 1975-2716 Hz. Response(s) absent at 9866-6442 Hz.  Present OAEs suggest normal or near cochlear outer hair cell function for corresponding frequency region(s). Absent OAEs with normal middle ear function can be consistent with some degree of hearing loss. Assessment of cochlear outer hair cell function may be impacted by outer or middle ear function.    Test technique:  Pure Tone Audiometry via insert earphones  Reliability:   good    Pure Tone Audiometry:    Right Ear: Hearing sensitivity within normal limits for 125-8000 Hz.    Left Ear: Hearing sensitivity within normal limits for 125-8000 Hz.      Speech " Audiometry:   Right Ear:  Speech Reception Threshold (SRT) was obtained at 0 dB HL. This is in good agreement with three frequency Pure Tone Average. Word Recognition scores were excellent (100%) in quiet when words were presented at 50 dB HL. These results are based on [facility] Auditory Test No.6 (NU-6) Ordered by difficulty (N=10).   Left Ear:  Speech Reception Threshold (SRT) was obtained at 0 dB HL. This is in good agreement with three frequency Pure Tone Average. Word Recognition scores were excellent (100%) in quiet when words were presented at 50 dB HL. These results are based on [facility] Auditory Test No.6 (NU-6) Ordered by difficulty (N=10).     Comparison of today's results with previous test results: No previous results available.         PATIENT EDUCATION     Discussed results and recommendations with Ms. Ocasio. Questions were addressed and the patient was encouraged to contact our department at [phone] should concerns arise.     MAO Pittman, CCC-A  Licensed Clinical Audiologist    Degree of   Hearing Sensitivity dB Range   Within Normal Limits (WNL) 0 - 20   Slight 25   Mild 26 - 40   Moderate 41 - 55   Moderately-Severe 56 - 70   Severe 71 - 90   Profound 91 +     Key   CHL Conductive Hearing Loss   ECV Ear Canal Volume   HA Hearing Aid   NIHL Noise-Induced Hearing Loss   PTA Pure Tone Average   SNHL Sensorineural Hearing Loss   TM Tympanic Membrane   WNL Within Normal Limits

## 2024-06-10 NOTE — PATIENT INSTRUCTIONS
IMPRESSIONS     Hearing sensitivity within normal limits for 125-8000 Hz in both ears.  DPOAE responses essentially present in both ears.  Word understanding in quiet is excellent in both ears.  Tympanometry indicates normal middle ear pressure and tympanic membrane mobility in both ears.     Today's test results are normal hearing sensitivity.     Amplification needs: Amplification is not warranted at this time.    RECOMMENDATIONS     Continue medical follow up with primary care provider and/or Ears Nose and Throat (ENT) provider as recommended.  Return for audiologic evaluation in conjunction with medical management to monitor hearing sensitivity and assess middle ear status or sooner should concerns arise. The audiology department can be reached at [phone] to schedule an appointment.   Avoid exposure to loud sounds by moving away from the noise, turning down the volume, or wearing proper hearing protection correctly.  Consider use of tinnitus management options, which include but are not limited to the following: sound therapy (use of pleasant or calming sounds that diminish the presence of tinnitus); mindfulness, meditation, and breathing exercises; sleep hygiene; mental health evaluation and formal therapies; psychiatric management of psychopharmaceuticals; dental/orthodontia evaluation; dietary changes (reduction of sodium, caffeine, alcohol); lifestyle changes (exercise, etc.); use of hearing protection and avoidance of loud noise; and formal tinnitus therapies (Tinnitus Retraining Therapy/ TRT, Progressive Tinnitus Management, Tinnitus Activities Treatment, Cognitive Behavioral Therapy).

## 2024-06-10 NOTE — Clinical Note
Thank you for referring this patient for an audiologic evaluation. They were seen today for a hearing test. Please see attached encounter note and scanned audiogram for impressions and recommendations.

## 2024-06-11 ENCOUNTER — DOCUMENTATION (OUTPATIENT)
Dept: OBSTETRICS AND GYNECOLOGY | Facility: CLINIC | Age: 44
End: 2024-06-11
Payer: COMMERCIAL

## 2024-06-11 NOTE — PROGRESS NOTES
PA submitted for Stephanie Landaverde  Key: [id number]  PA Case ID #: [id number]  Rx #: [id number]    CaseId:[id number]  Status:Approved  Review Type:Prior Auth  Coverage Start Date:05/12/2024  Coverage End Date:06/11/2025     Patient notified of approval

## 2024-06-19 LAB
CYTOLOGY CMNT CVX/VAG CYTO-IMP: NORMAL
HPV HR 12 DNA GENITAL QL NAA+PROBE: NEGATIVE
HPV HR GENOTYPES PNL CVX NAA+PROBE: NEGATIVE
HPV16 DNA SPEC QL NAA+PROBE: NEGATIVE
HPV18 DNA SPEC QL NAA+PROBE: NEGATIVE
LAB AP CONTRACEPTIVE HISTORY: NORMAL
LAB AP HPV GENOTYPE QUESTION: YES
LAB AP HPV HR: NORMAL
LAB AP PREVIOUS ABNORMAL HISTORY: NORMAL
LABORATORY COMMENT REPORT: NORMAL
LMP START DATE: NORMAL
PATH REPORT.TOTAL CANCER: NORMAL

## 2024-06-21 ENCOUNTER — TELEPHONE (OUTPATIENT)
Dept: OBSTETRICS AND GYNECOLOGY | Facility: CLINIC | Age: 44
End: 2024-06-21

## 2024-06-21 DIAGNOSIS — Z12.31 ENCOUNTER FOR SCREENING MAMMOGRAM FOR MALIGNANT NEOPLASM OF BREAST: ICD-10-CM

## 2024-06-21 NOTE — TELEPHONE ENCOUNTER
Patient tried to schedule Mammogram and was not able to because no orders are in. Please put orders in

## 2024-06-24 ENCOUNTER — APPOINTMENT (OUTPATIENT)
Dept: RADIOLOGY | Facility: HOSPITAL | Age: 44
End: 2024-06-24
Payer: COMMERCIAL

## 2024-06-24 VITALS — WEIGHT: 135 LBS | HEIGHT: 64 IN | BODY MASS INDEX: 23.05 KG/M2

## 2024-06-24 DIAGNOSIS — Z12.31 ENCOUNTER FOR SCREENING MAMMOGRAM FOR MALIGNANT NEOPLASM OF BREAST: ICD-10-CM

## 2024-06-24 PROCEDURE — 77067 SCR MAMMO BI INCL CAD: CPT

## 2024-06-24 PROCEDURE — 77063 BREAST TOMOSYNTHESIS BI: CPT | Performed by: RADIOLOGY

## 2024-06-24 PROCEDURE — 77067 SCR MAMMO BI INCL CAD: CPT | Performed by: RADIOLOGY

## 2024-08-06 DIAGNOSIS — J31.0 CHRONIC RHINITIS: ICD-10-CM

## 2024-08-07 RX ORDER — FLUTICASONE PROPIONATE 50 MCG
2 SPRAY, SUSPENSION (ML) NASAL DAILY
Qty: 16 G | Refills: 2 | Status: SHIPPED | OUTPATIENT
Start: 2024-08-07 | End: 2024-09-06

## 2024-09-16 DIAGNOSIS — N80.9 ENDOMETRIOSIS: ICD-10-CM

## 2024-09-17 RX ORDER — RELUGOLIX, ESTRADIOL HEMIHYDRATE, AND NORETHINDRONE ACETATE 40; 1; .5 MG/1; MG/1; MG/1
1 TABLET, FILM COATED ORAL DAILY
Qty: 90 TABLET | Refills: 0 | Status: SHIPPED | OUTPATIENT
Start: 2024-09-17

## 2024-10-02 ENCOUNTER — APPOINTMENT (OUTPATIENT)
Dept: OBSTETRICS AND GYNECOLOGY | Facility: CLINIC | Age: 44
End: 2024-10-02
Payer: COMMERCIAL

## 2024-10-02 VITALS — SYSTOLIC BLOOD PRESSURE: 119 MMHG | BODY MASS INDEX: 24.89 KG/M2 | WEIGHT: 145 LBS | DIASTOLIC BLOOD PRESSURE: 83 MMHG

## 2024-10-02 DIAGNOSIS — R45.4 IRRITABILITY: ICD-10-CM

## 2024-10-02 DIAGNOSIS — R10.2 FEMALE PELVIC PAIN: ICD-10-CM

## 2024-10-02 DIAGNOSIS — N80.9 ENDOMETRIOSIS: Primary | ICD-10-CM

## 2024-10-02 PROCEDURE — 99214 OFFICE O/P EST MOD 30 MIN: CPT | Performed by: OBSTETRICS & GYNECOLOGY

## 2024-10-02 PROCEDURE — 1036F TOBACCO NON-USER: CPT | Performed by: OBSTETRICS & GYNECOLOGY

## 2024-10-02 RX ORDER — NORETHINDRONE ACETATE AND ETHINYL ESTRADIOL .02; 1 MG/1; MG/1
1 TABLET ORAL DAILY
Qty: 84 TABLET | Refills: 3 | Status: SHIPPED | OUTPATIENT
Start: 2024-10-02 | End: 2025-10-02

## 2024-10-02 ASSESSMENT — ENCOUNTER SYMPTOMS
DEPRESSION: 0
OCCASIONAL FEELINGS OF UNSTEADINESS: 0
LOSS OF SENSATION IN FEET: 0

## 2024-10-02 ASSESSMENT — PAIN SCALES - GENERAL: PAINLEVEL: 0-NO PAIN

## 2024-10-02 NOTE — PROGRESS NOTES
"SUBJECTIVE    44 y.o.  Having periods female presents for   Chief Complaint   Patient presents with    Consult     Pt here to consult treatment of endometriosis  Pt has hx of endometriosis and is currently taking myfembree but would like to discuss other options due to side effects while on this, pt c/o mood irritability  Last pap 2024 WNL  Mamm 2024      Pt presents to discuss symptoms related to her dx of endometriosis. She was diagnosed with endometriosis in  during a laparoscopy performed by [facility].  At that time she was not having pelvic pain (incidental finding).  Her recollection was that it was \"mild.\"      Throughout the years she has had intermittent abdominal pain. That was never treated.  This year, though, she started having more significant pain that impacted normal activities.  Pain is central in her lower pelvis.  Pain seems to be the worst at the end of her period, although it can occur throughout the month.  She has a copper IUD that was placed about 5 years ago.  At her last visit in  she saw Haven Rodriguez-- an ultrasound was ordered which showed a normal sized uterus, appropriately placed IUD, and no adnexal masses. At that point she was started on Myfembree.      Since starting Myfembree, the patient reports she has had continued abdominal pain-- her pain was initially improved but has since returned.  Overall, though, her pain is improved. Her most concerning symptom, though, has been increase in mood changes and irritability. Irritability is the largest symptom-- very impatient, especially with her children.  Sleep patterns are unchanged.  She has had hot flashes (although she did have these prior to Myfembree).      Pt denies dysmenorrhea.    OB/GYN History  Patient's last menstrual period was 2024 (approximate).    Social History     Substance and Sexual Activity   Sexual Activity Yes    Partners: Male    Birth control/protection: I.U.D.       OB History    " Para Term  AB Living   3 2 0         SAB IAB Ectopic Multiple Live Births                  # Outcome Date GA Lbr Gurwinder/2nd Weight Sex Type Anes PTL Lv   3             2 Para            1 Para                Past Medical History  She has a past medical history of Abnormal Pap smear of cervix (), Anxiety, CTS (carpal tunnel syndrome) (), Depression, Endometriosis (), Female infertility (), and Migraine ().    Surgical History  She has a past surgical history that includes [place] tooth extraction; Colposcopy (); and Exploratory laparotomy ().     Social History  She reports that she has never smoked. She has never been exposed to tobacco smoke. She has never used smokeless tobacco. She reports current alcohol use of about 4.0 standard drinks of alcohol per week. She reports that she does not use drugs.    Screenings  Social Determinants of Health     Tobacco Use: Low Risk  (10/2/2024)    Patient History     Smoking Tobacco Use: Never     Smokeless Tobacco Use: Never     Passive Exposure: Never   Alcohol Use: Alcohol Misuse (2024)    AUDIT-C     Frequency of Alcohol Consumption: 2-3 times a week     Average Number of Drinks: 1 or 2     Frequency of Binge Drinking: Less than monthly   Financial Resource Strain: Not on file   Food Insecurity: Not on file   Transportation Needs: Not on file   Physical Activity: Not on file   Stress: No Stress Concern Present (2024)    [employer] - Occupational Stress Questionnaire     Feeling of Stress : Not at all   Social Connections: Not on file   Intimate Partner Violence: Not on file   Depression: Not at risk (2024)    PHQ-2     PHQ-2 Score: 0   Housing Stability: Not on file   Utilities: Not on file   Digital Equity: Not on file   Health Literacy: Not on file         OBJECTIVE  Vitals:    10/02/24 1109   BP: 119/83   Weight: 65.8 kg (145 lb)     Body mass index is 24.89 kg/m².     Chaperone: Present:  NA  OBGyn Exam deferrred    ASSESSMENT & PLAN  Problem List Items Addressed This Visit    None  Visit Diagnoses       Endometriosis    -  Primary    Relevant Medications    norethindrone ac-eth estradioL (Loestrin 1/20, 21,) 1-20 mg-mcg tablet    Female pelvic pain        Relevant Medications    norethindrone ac-eth estradioL (Loestrin 1/20, 21,) 1-20 mg-mcg tablet    Irritability        Relevant Medications    norethindrone ac-eth estradioL (Loestrin 1/20, 21,) 1-20 mg-mcg tablet            Follow up: We discussed the traditional management of endometriosis pain, typically starting with a RALPH or POP (pt does not have any contraindication to a RALPH).  Given pt's side effects from Myfembree, will stop that medication and begin a low-dose OCP.  Also discussed additional therapy (potentially changing her copper IUD to a Mirena; elagolix, or laparoscopy). Pt in agreement with plan. RTO 3 months.    Andrzej Hickman MD  Obstetrics & Gynecology  10/02/24

## 2025-01-08 ENCOUNTER — APPOINTMENT (OUTPATIENT)
Dept: OBSTETRICS AND GYNECOLOGY | Facility: CLINIC | Age: 45
End: 2025-01-08
Payer: COMMERCIAL

## 2025-01-08 VITALS
HEART RATE: 78 BPM | WEIGHT: 144.8 LBS | BODY MASS INDEX: 24.85 KG/M2 | DIASTOLIC BLOOD PRESSURE: 84 MMHG | SYSTOLIC BLOOD PRESSURE: 121 MMHG

## 2025-01-08 DIAGNOSIS — N80.9 ENDOMETRIOSIS: Primary | ICD-10-CM

## 2025-01-08 DIAGNOSIS — R10.2 FEMALE PELVIC PAIN: ICD-10-CM

## 2025-01-08 DIAGNOSIS — N94.6 DYSMENORRHEA: ICD-10-CM

## 2025-01-08 PROCEDURE — 99214 OFFICE O/P EST MOD 30 MIN: CPT | Performed by: OBSTETRICS & GYNECOLOGY

## 2025-01-08 PROCEDURE — 1036F TOBACCO NON-USER: CPT | Performed by: OBSTETRICS & GYNECOLOGY

## 2025-01-08 ASSESSMENT — ENCOUNTER SYMPTOMS
GASTROINTESTINAL NEGATIVE: 0
RESPIRATORY NEGATIVE: 0
ALLERGIC/IMMUNOLOGIC NEGATIVE: 0
PSYCHIATRIC NEGATIVE: 0
NEUROLOGICAL NEGATIVE: 0
MUSCULOSKELETAL NEGATIVE: 0
ENDOCRINE NEGATIVE: 0
CONSTITUTIONAL NEGATIVE: 0
EYES NEGATIVE: 0
CARDIOVASCULAR NEGATIVE: 0
HEMATOLOGIC/LYMPHATIC NEGATIVE: 0

## 2025-01-08 NOTE — PROGRESS NOTES
"SUBJECTIVE    44 y.o.  Having periods female presents for   Chief Complaint   Patient presents with    Follow-up     DISCUSS MEDICATION       Pt here for follow up. Was seen in 10/2024 for a h/o endometriosis with increasing pelvic pain/dysmenorrhea and had been started on Myfembree (pt had a copper IUD) by Haven Rodriguez.  This medication caused irritation/mood changes although her pain was improved.  At that time we swapped her to LoEstrin. She also had a pelvic ultrasound in 2024 showing a normal uterus and appropriately located IUD.    Pt reports things are \"OK.\"  Menses are lighter (only 4 days long instead of 7-8).  Still has dysmenorrhea at a level of 5/10 (7-8/10 if someone touches her).  Help with heating pads.  Takes OTC Ibuprofen or Aleve which does not help as much.  She can also have some breakthrough bleeding late in her pill pack (enough to wear a liner).    OB/GYN History  No LMP recorded.    Social History     Substance and Sexual Activity   Sexual Activity Yes    Partners: Male    Birth control/protection: I.U.D.       OB History    Para Term  AB Living   3 2 1   1 2   SAB IAB Ectopic Multiple Live Births   1       2      # Outcome Date GA Lbr Gurwinder/2nd Weight Sex Type Anes PTL Lv   3 Para 2018     Vag-Spont   CARINA   2 Term 2016     Vag-Spont   CARINA   1 SAB 2015               Past Medical History  She has a past medical history of Abnormal Pap smear of cervix (), Anxiety, CTS (carpal tunnel syndrome) (), Depression, Endometriosis (), Female infertility (), and Migraine ().    Surgical History  She has a past surgical history that includes [place] tooth extraction; Colposcopy (); and Pelvic laparoscopy ().     Social History  She reports that she has never smoked. She has never been exposed to tobacco smoke. She has never used smokeless tobacco. She reports current alcohol use of about 4.0 standard drinks of alcohol per week. She reports that she does not " use drugs.    Medications:    Current Outpatient Medications:     DULoxetine (Cymbalta) 60 mg DR capsule, Take 1 capsule (60 mg) by mouth once daily. Do not crush or chew., Disp: 90 capsule, Rfl: 3    fluticasone (Flonase) 50 mcg/actuation nasal spray, Administer 2 sprays into each nostril once daily. Shake gently. Before first use, prime pump. After use, clean tip and replace cap., Disp: 16 g, Rfl: 2    multivit-minerals/folic acid (MULTIVITAMIN GUMMIES ORAL), Take by mouth., Disp: , Rfl:     norethindrone ac-eth estradioL (Loestrin 1/20, 21,) 1-20 mg-mcg tablet, Take 1 tablet by mouth once daily., Disp: 84 tablet, Rfl: 3    propranolol (Inderal) 10 mg tablet, Take 1 tablet (10 mg) by mouth 2 times a day., Disp: 180 tablet, Rfl: 3    Screenings  Social Drivers of Health     Tobacco Use: Low Risk  (1/8/2025)    Patient History     Smoking Tobacco Use: Never     Smokeless Tobacco Use: Never     Passive Exposure: Never   Alcohol Use: Alcohol Misuse (6/4/2024)    AUDIT-C     Frequency of Alcohol Consumption: 2-3 times a week     Average Number of Drinks: 1 or 2     Frequency of Binge Drinking: Less than monthly   Financial Resource Strain: Not on file   Food Insecurity: Not on file   Transportation Needs: Not on file   Physical Activity: Not on file   Stress: No Stress Concern Present (6/4/2024)    [employer] [place] [employer] - Occupational Stress Questionnaire     Feeling of Stress : Not at all   Social Connections: Not on file   Intimate Partner Violence: Not on file   Depression: Not at risk (4/4/2024)    PHQ-2     PHQ-2 Score: 0   Housing Stability: Not on file   Utilities: Not on file   Digital Equity: Not on file   Health Literacy: Not on file         OBJECTIVE  Vitals:    01/08/25 0959   BP: 121/84   BP Location: Left arm   Patient Position: Sitting   Pulse: 78   Weight: 65.7 kg (144 lb 12.8 oz)     Body mass index is 24.85 kg/m².     Chaperone: Present: NA  OBGyn Exam deferred    ASSESSMENT &  PLAN  Problem List Items Addressed This Visit    None  Visit Diagnoses       Endometriosis    -  Primary    Female pelvic pain        Dysmenorrhea              Follow up: We reviewed options for additional management-- these included changing to a higher estrogen OCP (to avoid the BTB), exchanging copper IUD to progesterone IUD, prescription NSAIDs, and laparoscopy. Given likelihood of copper IUD contributing to sx, will first opt to IUD exchange. If symptoms do not improve will move towards laparoscopy.  Pt ill schedule for IUD exchange.    Andrzej Hickman MD  Obstetrics & Gynecology  01/08/25

## 2025-01-27 ENCOUNTER — APPOINTMENT (OUTPATIENT)
Dept: OBSTETRICS AND GYNECOLOGY | Facility: CLINIC | Age: 45
End: 2025-01-27
Payer: COMMERCIAL

## 2025-01-28 ASSESSMENT — ENCOUNTER SYMPTOMS
HEMATOCHEZIA: 0
ABDOMINAL PAIN: 1
HEMATURIA: 0
FREQUENCY: 0
WEIGHT LOSS: 0
CONSTIPATION: 0
FLATUS: 0
NAUSEA: 0
BELCHING: 0
MYALGIAS: 0
VOMITING: 0
DIARRHEA: 0
ARTHRALGIAS: 1
FEVER: 0
DYSURIA: 0
ANOREXIA: 0
HEADACHES: 0

## 2025-01-29 ENCOUNTER — APPOINTMENT (OUTPATIENT)
Dept: OBSTETRICS AND GYNECOLOGY | Facility: CLINIC | Age: 45
End: 2025-01-29
Payer: COMMERCIAL

## 2025-01-29 VITALS
WEIGHT: 145 LBS | SYSTOLIC BLOOD PRESSURE: 124 MMHG | DIASTOLIC BLOOD PRESSURE: 83 MMHG | BODY MASS INDEX: 24.75 KG/M2 | HEIGHT: 64 IN

## 2025-01-29 DIAGNOSIS — N80.9 ENDOMETRIOSIS: ICD-10-CM

## 2025-01-29 DIAGNOSIS — Z30.430 ENCOUNTER FOR INTRAUTERINE DEVICE PLACEMENT: Primary | ICD-10-CM

## 2025-01-29 DIAGNOSIS — Z30.432 ENCOUNTER FOR IUD REMOVAL: ICD-10-CM

## 2025-01-29 DIAGNOSIS — N94.6 DYSMENORRHEA: ICD-10-CM

## 2025-01-29 LAB — PREGNANCY TEST URINE, POC: NEGATIVE

## 2025-01-29 PROCEDURE — 58301 REMOVE INTRAUTERINE DEVICE: CPT | Performed by: OBSTETRICS & GYNECOLOGY

## 2025-01-29 PROCEDURE — 58300 INSERT INTRAUTERINE DEVICE: CPT | Performed by: OBSTETRICS & GYNECOLOGY

## 2025-01-29 PROCEDURE — 81025 URINE PREGNANCY TEST: CPT | Performed by: OBSTETRICS & GYNECOLOGY

## 2025-01-29 ASSESSMENT — PATIENT HEALTH QUESTIONNAIRE - PHQ9
2. FEELING DOWN, DEPRESSED OR HOPELESS: NOT AT ALL
SUM OF ALL RESPONSES TO PHQ9 QUESTIONS 1 & 2: 0
1. LITTLE INTEREST OR PLEASURE IN DOING THINGS: NOT AT ALL

## 2025-01-29 ASSESSMENT — PAIN SCALES - GENERAL: PAINLEVEL_OUTOF10: 0-NO PAIN

## 2025-01-29 NOTE — PROGRESS NOTES
IUD Removal and Insertion Procedure Note    Indications:  endometriosis, AUB, and dysmenorrhea    Procedure Details   Urine pregnancy test was done and negative.  The risks (including infection, bleeding, pain, and uterine perforation) and benefits of the procedure were explained to the patient and Written informed consent was obtained.      Procedure: Mirena (IUD) placement; IUD removal  Speculum placed and cervix visualized. The strings of the pre-existing Parguard were grasped and the IUD removed without incident. Cervix cleansed with Betadine. Uterus sounded to 7 cm.   Local anesthesia:   none  Tenaculum used:  Yes, single tooth, anterior  IUD inserted without difficulty.   String visible and trimmed to 3cm.  Patient tolerated procedure well.    Condition:  Stable    Complications:  None    Plan:  The patient was advised to call for any fever or for prolonged or severe pain or bleeding. She was advised to use NSAID as needed for mild to moderate pain.     Andrzej Hickman MD

## 2025-02-20 ASSESSMENT — PROMIS GLOBAL HEALTH SCALE
RATE_GENERAL_HEALTH: GOOD
RATE_AVERAGE_PAIN: 3
RATE_QUALITY_OF_LIFE: VERY GOOD
RATE_SOCIAL_SATISFACTION: GOOD
EMOTIONAL_PROBLEMS: ALWAYS
RATE_AVERAGE_FATIGUE: MODERATE
CARRYOUT_SOCIAL_ACTIVITIES: GOOD
RATE_PHYSICAL_HEALTH: GOOD
CARRYOUT_PHYSICAL_ACTIVITIES: MOSTLY
RATE_MENTAL_HEALTH: FAIR

## 2025-02-23 ASSESSMENT — ENCOUNTER SYMPTOMS
WHEEZING: 0
FREQUENCY: 0
CONSTIPATION: 0
DYSPHORIC MOOD: 0
DIARRHEA: 0
FATIGUE: 0
CHILLS: 0
HEMATURIA: 0
PALPITATIONS: 0
DIZZINESS: 0
NERVOUS/ANXIOUS: 0
FEVER: 0
HEADACHES: 0
ABDOMINAL PAIN: 0
NUMBNESS: 0
SHORTNESS OF BREATH: 0
NAUSEA: 0
DYSURIA: 0
COUGH: 0

## 2025-02-23 NOTE — PROGRESS NOTES
Missy Ocasio  presents for her annual wellness exam.    HPI  Specialists seen by patient: Neurology: POTS  -GYN  -dentist  -derm    Last pap/cervical cancer screenin  Last mammogram: , ordered by GYN  Hx of colon ca screening:  n/a  Hx of DXA: n/a  History of depression or anxiety:yes  Immunizations: up to date  Diet: Follows a healthy diet  Exercise: Not much right now  Alcohol abuse screen:   3-4 drinks per week  How many times in the past year 4 or more drinks in a day? once  Lung cancer screening:   Smoking history: never a smoker  Drug use: No   PHQ-9: 4  HCPOA: yes    Anxiety and depression F/up  Current medication: Cymbalta 60 mg  How they feel: Doing great outside of situational issues  Side effects: decreased sex drive  Previous medications: lexapro, wellbutrin, effexor  Self care: Reading, doing things for herself    Is on propranolol 10 mg twice daily for POTS    Having discomfort in L breast for 1 week. Comes and goes. No lumps or bumps. No discharge. No changes in the nipple. 12 oclock and axillary area. No family hx of breast cancer.  Mom does have lymphoma.  She did just have a recent upper respiratory infection    Due for fasting blood work    Fingers lock up when cutting things. Fluid intake is good. Potassium intake is good. Not painful. No swelling.     Inside of nose irritated for months. Blood when blowing her nose. Saline helps. Swabbing with vaseline.     Review of Systems   Constitutional:  Negative for chills, fatigue and fever.   Respiratory:  Negative for cough, shortness of breath and wheezing.    Cardiovascular:  Negative for chest pain, palpitations and leg swelling.   Gastrointestinal:  Negative for abdominal pain, constipation, diarrhea and nausea.   Genitourinary:  Negative for dysuria, frequency, hematuria and urgency.   Neurological:  Negative for dizziness, numbness and headaches.   Psychiatric/Behavioral:  Negative for dysphoric mood. The patient is not  "nervous/anxious.           Objective    /64 (BP Location: Left arm, Patient Position: Sitting, BP Cuff Size: Adult)   Pulse 55   Temp 36.4 °C (97.5 °F) (Skin)   Ht 1.626 m (5' 4\")   Wt 64.5 kg (142 lb 3.2 oz)   SpO2 98%   BMI 24.41 kg/m²     Physical Exam  Constitutional:       General: She is not in acute distress.     Appearance: Normal appearance.   HENT:      Head: Normocephalic and atraumatic.      Right Ear: Tympanic membrane and ear canal normal.      Left Ear: Tympanic membrane and ear canal normal.      Mouth/Throat:      Mouth: Mucous membranes are moist.      Pharynx: No posterior oropharyngeal erythema.   Eyes:      Extraocular Movements: Extraocular movements intact.      Pupils: Pupils are equal, round, and reactive to light.   Cardiovascular:      Rate and Rhythm: Normal rate and regular rhythm.      Heart sounds: No murmur heard.  Pulmonary:      Effort: Pulmonary effort is normal. No respiratory distress.      Breath sounds: Normal breath sounds. No wheezing.   Chest:   Breasts:     Right: Normal.      Left: Tenderness present. No swelling, bleeding, inverted nipple, mass, nipple discharge or skin change.       Abdominal:      General: Bowel sounds are normal.      Palpations: Abdomen is soft.      Tenderness: There is no abdominal tenderness. There is no guarding.   Musculoskeletal:         General: Normal range of motion.      Right hand: Normal.      Left hand: Normal.      Cervical back: Neck supple.   Lymphadenopathy:      Upper Body:      Left upper body: No supraclavicular, axillary or pectoral adenopathy.   Skin:     General: Skin is warm and dry.   Neurological:      General: No focal deficit present.      Mental Status: She is alert and oriented to person, place, and time.   Psychiatric:         Mood and Affect: Mood normal.         Behavior: Behavior normal.            Problem List Items Addressed This Visit       Anxiety    Relevant Medications    DULoxetine (Cymbalta) 60 mg " DR capsule    POTS (postural orthostatic tachycardia syndrome)    Relevant Medications    propranolol (Inderal) 10 mg tablet     Other Visit Diagnoses       Health maintenance examination    -  Primary    Relevant Orders    Lipid Panel    Comprehensive Metabolic Panel    CBC    Cramping of hands        Relevant Orders    Thyroid Stimulating Hormone    T4, free    Magnesium    Breast pain, left        Relevant Orders    BI mammo left diagnostic    BI US breast complete left    Depression screening                   We will obtain fasting blood work.  Results will be communicated to the patient via MyChart or a letter.   We reviewed appropriate preventative health screening guidelines.    We discussed regular aerobic exercise. We discussed proper nutrition and weight control.    Diagnostic mammogram and ultrasound of the left breast for her left breast pain    Will check magnesium and thyroid in addition to regular blood work for her hand cramping.  Can consider EMG.    Anxiety and depression well-controlled, will continue current medications.    5-10 minutes were spent screening for depression using PHQ-2/PHQ-9 as documented in the chart      Irais Marrero DO  02/24/25

## 2025-02-24 ENCOUNTER — APPOINTMENT (OUTPATIENT)
Dept: PRIMARY CARE | Facility: CLINIC | Age: 45
End: 2025-02-24
Payer: COMMERCIAL

## 2025-02-24 VITALS
BODY MASS INDEX: 24.28 KG/M2 | DIASTOLIC BLOOD PRESSURE: 64 MMHG | HEIGHT: 64 IN | OXYGEN SATURATION: 98 % | HEART RATE: 55 BPM | SYSTOLIC BLOOD PRESSURE: 110 MMHG | TEMPERATURE: 97.5 F | WEIGHT: 142.2 LBS

## 2025-02-24 DIAGNOSIS — G90.A POTS (POSTURAL ORTHOSTATIC TACHYCARDIA SYNDROME): ICD-10-CM

## 2025-02-24 DIAGNOSIS — N64.4 BREAST PAIN, LEFT: ICD-10-CM

## 2025-02-24 DIAGNOSIS — Z00.00 HEALTH MAINTENANCE EXAMINATION: Primary | ICD-10-CM

## 2025-02-24 DIAGNOSIS — Z13.31 DEPRESSION SCREENING: ICD-10-CM

## 2025-02-24 DIAGNOSIS — R25.2 CRAMPING OF HANDS: ICD-10-CM

## 2025-02-24 DIAGNOSIS — F41.9 ANXIETY: ICD-10-CM

## 2025-02-24 PROCEDURE — 99396 PREV VISIT EST AGE 40-64: CPT | Performed by: STUDENT IN AN ORGANIZED HEALTH CARE EDUCATION/TRAINING PROGRAM

## 2025-02-24 PROCEDURE — 3008F BODY MASS INDEX DOCD: CPT | Performed by: STUDENT IN AN ORGANIZED HEALTH CARE EDUCATION/TRAINING PROGRAM

## 2025-02-24 PROCEDURE — 96127 BRIEF EMOTIONAL/BEHAV ASSMT: CPT | Performed by: STUDENT IN AN ORGANIZED HEALTH CARE EDUCATION/TRAINING PROGRAM

## 2025-02-24 PROCEDURE — 1036F TOBACCO NON-USER: CPT | Performed by: STUDENT IN AN ORGANIZED HEALTH CARE EDUCATION/TRAINING PROGRAM

## 2025-02-24 PROCEDURE — 99214 OFFICE O/P EST MOD 30 MIN: CPT | Performed by: STUDENT IN AN ORGANIZED HEALTH CARE EDUCATION/TRAINING PROGRAM

## 2025-02-24 RX ORDER — PROPRANOLOL HYDROCHLORIDE 10 MG/1
10 TABLET ORAL 2 TIMES DAILY
Qty: 180 TABLET | Refills: 3 | Status: SHIPPED | OUTPATIENT
Start: 2025-02-24

## 2025-02-24 RX ORDER — DULOXETIN HYDROCHLORIDE 60 MG/1
60 CAPSULE, DELAYED RELEASE ORAL DAILY
Qty: 90 CAPSULE | Refills: 3 | Status: SHIPPED | OUTPATIENT
Start: 2025-02-24

## 2025-02-24 ASSESSMENT — PATIENT HEALTH QUESTIONNAIRE - PHQ9
2. FEELING DOWN, DEPRESSED OR HOPELESS: NOT AT ALL
SUM OF ALL RESPONSES TO PHQ9 QUESTIONS 1 AND 2: 0
1. LITTLE INTEREST OR PLEASURE IN DOING THINGS: NOT AT ALL

## 2025-02-24 ASSESSMENT — ENCOUNTER SYMPTOMS
DEPRESSION: 0
LOSS OF SENSATION IN FEET: 0
OCCASIONAL FEELINGS OF UNSTEADINESS: 0

## 2025-02-26 ENCOUNTER — APPOINTMENT (OUTPATIENT)
Dept: OBSTETRICS AND GYNECOLOGY | Facility: CLINIC | Age: 45
End: 2025-02-26
Payer: COMMERCIAL

## 2025-02-26 VITALS
SYSTOLIC BLOOD PRESSURE: 117 MMHG | BODY MASS INDEX: 24.59 KG/M2 | DIASTOLIC BLOOD PRESSURE: 81 MMHG | WEIGHT: 144 LBS | HEIGHT: 64 IN

## 2025-02-26 DIAGNOSIS — N94.6 DYSMENORRHEA: ICD-10-CM

## 2025-02-26 DIAGNOSIS — N80.9 ENDOMETRIOSIS: Primary | ICD-10-CM

## 2025-02-26 PROCEDURE — 3008F BODY MASS INDEX DOCD: CPT | Performed by: OBSTETRICS & GYNECOLOGY

## 2025-02-26 PROCEDURE — 1036F TOBACCO NON-USER: CPT | Performed by: OBSTETRICS & GYNECOLOGY

## 2025-02-26 PROCEDURE — 99213 OFFICE O/P EST LOW 20 MIN: CPT | Performed by: OBSTETRICS & GYNECOLOGY

## 2025-02-26 PROCEDURE — 99459 PELVIC EXAMINATION: CPT | Performed by: OBSTETRICS & GYNECOLOGY

## 2025-02-26 RX ORDER — LEVONORGESTREL 52 MG/1
1 INTRAUTERINE DEVICE INTRAUTERINE ONCE
COMMUNITY

## 2025-02-26 ASSESSMENT — PAIN SCALES - GENERAL: PAINLEVEL_OUTOF10: 0-NO PAIN

## 2025-02-26 NOTE — PROGRESS NOTES
SUBJECTIVE    44 y.o.  Having periods female presents for fuv after IUD exchange.    Last Visit: 2025- Pt was still having pelvic pain so opted for IUD exchanged. She had copper IUD removed and Mirena placed .    Today pt reports she's doing well. She had some spotting after IUD insertion. Believes she started period 2 days ago, has more bleeding than spotting but not as heavy as her normal periods had been. Not having much cramping, says she usually has it near the end of her cycle (cycle normally 5-6 days). Had some mild cramping about mid way through her cycle . Sexually active with 1 male partner, denies dyspareunia. Denies f/c/n/v, abd pain, abnormal vaginal discharge.           OB/GYN History  No LMP recorded.    Social History     Substance and Sexual Activity   Sexual Activity Yes    Partners: Male    Birth control/protection: I.U.D.       OB History    Para Term  AB Living   3 2 1   1 2   SAB IAB Ectopic Multiple Live Births   1       2      # Outcome Date GA Lbr Gurwinder/2nd Weight Sex Type Anes PTL Lv   3 Para 2018     Vag-Spont   CARINA   2 Term 2016     Vag-Spont   CARINA   1 SAB 2015               Past Medical History  She has a past medical history of Abnormal Pap smear of cervix (), Anxiety, CTS (carpal tunnel syndrome) (), Depression, Endometriosis (), Female infertility (), and Migraine ().    Surgical History  She has a past surgical history that includes [place] tooth extraction; Colposcopy (); and Pelvic laparoscopy ().     Social History  She reports that she has never smoked. She has never been exposed to tobacco smoke. She has never used smokeless tobacco. She reports current alcohol use of about 4.0 standard drinks of alcohol per week. She reports that she does not use drugs.  Tobacco: denies  Alcohol: glass of wine after dinner some nights  Drugs: None      Medications:    Current Outpatient Medications:     DULoxetine (Cymbalta) 60 mg   capsule, Take 1 capsule (60 mg) by mouth once daily. Do not crush or chew., Disp: 90 capsule, Rfl: 3    fluticasone (Flonase) 50 mcg/actuation nasal spray, Administer 2 sprays into each nostril once daily. Shake gently. Before first use, prime pump. After use, clean tip and replace cap., Disp: 16 g, Rfl: 2    propranolol (Inderal) 10 mg tablet, Take 1 tablet (10 mg) by mouth 2 times a day., Disp: 180 tablet, Rfl: 3    Screenings  Social Drivers of Health     Tobacco Use: Low Risk  (2/24/2025)    Patient History     Smoking Tobacco Use: Never     Smokeless Tobacco Use: Never     Passive Exposure: Never   Alcohol Use: Alcohol Misuse (6/4/2024)    AUDIT-C     Frequency of Alcohol Consumption: 2-3 times a week     Average Number of Drinks: 1 or 2     Frequency of Binge Drinking: Less than monthly   Financial Resource Strain: Not on file   Food Insecurity: Not on file   Transportation Needs: Not on file   Physical Activity: Not on file   Stress: No Stress Concern Present (6/4/2024)    [employer] [place] [employer] - Occupational Stress Questionnaire     Feeling of Stress : Not at all   Social Connections: Not on file   Intimate Partner Violence: Not on file   Depression: Not at risk (2/24/2025)    PHQ-2     PHQ-2 Score: 0   Housing Stability: Not on file   Utilities: Not on file   Digital Equity: Not on file   Health Literacy: Not on file         OBJECTIVE  There were no vitals filed for this visit.  There is no height or weight on file to calculate BMI.     Chaperone: Present: yes  Physical Exam  Genitourinary:      Vaginal bleeding (Scant dark) present.      IUD strings visualized.        ASSESSMENT & PLAN      Follow up:   Patient overall doing well. Not at the end of her period when she usually experiences pain so will still need some time to see if there's any improvement with the Mirena. On exam there was scant bleeding, and the IUD string was seen. Will follow up in 3 months.      Patient seen and  staffed with attending physician, Dr. Marylou Royal MD  Internal Medicine, PGY-2    02/26/25

## 2025-02-28 LAB
ALBUMIN SERPL-MCNC: 4.3 G/DL (ref 3.6–5.1)
ALP SERPL-CCNC: 59 U/L (ref 31–125)
ALT SERPL-CCNC: 11 U/L (ref 6–29)
ANION GAP SERPL CALCULATED.4IONS-SCNC: 8 MMOL/L (CALC) (ref 7–17)
AST SERPL-CCNC: 14 U/L (ref 10–30)
BILIRUB SERPL-MCNC: 0.6 MG/DL (ref 0.2–1.2)
BUN SERPL-MCNC: 9 MG/DL (ref 7–25)
CALCIUM SERPL-MCNC: 9.2 MG/DL (ref 8.6–10.2)
CHLORIDE SERPL-SCNC: 105 MMOL/L (ref 98–110)
CHOLEST SERPL-MCNC: 186 MG/DL
CHOLEST/HDLC SERPL: 3.3 (CALC)
CO2 SERPL-SCNC: 28 MMOL/L (ref 20–32)
CREAT SERPL-MCNC: 0.67 MG/DL (ref 0.5–0.99)
EGFRCR SERPLBLD CKD-EPI 2021: 112 ML/MIN/1.73M2
ERYTHROCYTE [DISTWIDTH] IN BLOOD BY AUTOMATED COUNT: 12 % (ref 11–15)
GLUCOSE SERPL-MCNC: 92 MG/DL (ref 65–99)
HCT VFR BLD AUTO: 41.2 % (ref 35–45)
HDLC SERPL-MCNC: 56 MG/DL
HGB BLD-MCNC: 13.8 G/DL (ref 11.7–15.5)
LDLC SERPL CALC-MCNC: 114 MG/DL (CALC)
MAGNESIUM SERPL-MCNC: 2.2 MG/DL (ref 1.5–2.5)
MCH RBC QN AUTO: 30.7 PG (ref 27–33)
MCHC RBC AUTO-ENTMCNC: 33.5 G/DL (ref 32–36)
MCV RBC AUTO: 91.8 FL (ref 80–100)
NONHDLC SERPL-MCNC: 130 MG/DL (CALC)
PLATELET # BLD AUTO: 299 THOUSAND/UL (ref 140–400)
PMV BLD REES-ECKER: 11.7 FL (ref 7.5–12.5)
POTASSIUM SERPL-SCNC: 4.6 MMOL/L (ref 3.5–5.3)
PROT SERPL-MCNC: 7.2 G/DL (ref 6.1–8.1)
RBC # BLD AUTO: 4.49 MILLION/UL (ref 3.8–5.1)
SODIUM SERPL-SCNC: 141 MMOL/L (ref 135–146)
T4 FREE SERPL-MCNC: 1.1 NG/DL (ref 0.8–1.8)
TRIGL SERPL-MCNC: 64 MG/DL
TSH SERPL-ACNC: 1.78 MIU/L
WBC # BLD AUTO: 4.8 THOUSAND/UL (ref 3.8–10.8)

## 2025-03-18 ENCOUNTER — OFFICE VISIT (OUTPATIENT)
Dept: URGENT CARE | Age: 45
End: 2025-03-18
Payer: COMMERCIAL

## 2025-03-18 VITALS
HEART RATE: 84 BPM | OXYGEN SATURATION: 98 % | TEMPERATURE: 97.5 F | RESPIRATION RATE: 20 BRPM | SYSTOLIC BLOOD PRESSURE: 119 MMHG | DIASTOLIC BLOOD PRESSURE: 84 MMHG

## 2025-03-18 DIAGNOSIS — J01.00 ACUTE NON-RECURRENT MAXILLARY SINUSITIS: Primary | ICD-10-CM

## 2025-03-18 DIAGNOSIS — J02.9 PHARYNGITIS, UNSPECIFIED ETIOLOGY: ICD-10-CM

## 2025-03-18 DIAGNOSIS — R05.1 ACUTE COUGH: ICD-10-CM

## 2025-03-18 DIAGNOSIS — H69.91 EUSTACHIAN TUBE DYSFUNCTION, RIGHT: ICD-10-CM

## 2025-03-18 PROCEDURE — 1036F TOBACCO NON-USER: CPT

## 2025-03-18 PROCEDURE — 99213 OFFICE O/P EST LOW 20 MIN: CPT

## 2025-03-18 RX ORDER — FLUTICASONE PROPIONATE 50 MCG
1 SPRAY, SUSPENSION (ML) NASAL DAILY
Qty: 16 G | Refills: 0 | Status: SHIPPED | OUTPATIENT
Start: 2025-03-18 | End: 2026-03-18

## 2025-03-18 RX ORDER — AMOXICILLIN AND CLAVULANATE POTASSIUM 875; 125 MG/1; MG/1
875 TABLET, FILM COATED ORAL 2 TIMES DAILY
Qty: 14 TABLET | Refills: 0 | Status: SHIPPED | OUTPATIENT
Start: 2025-03-18 | End: 2025-03-25

## 2025-03-18 ASSESSMENT — ENCOUNTER SYMPTOMS
HOARSE VOICE: 0
SORE THROAT: 1
COUGH: 1
HEADACHES: 1
ABDOMINAL PAIN: 0
STRIDOR: 0
DIZZINESS: 0
SHORTNESS OF BREATH: 0
CHILLS: 0
NECK PAIN: 0
LIGHT-HEADEDNESS: 0
RHINORRHEA: 1
VOMITING: 0
SINUS PAIN: 0
FATIGUE: 0
SWOLLEN GLANDS: 0
SINUS PRESSURE: 1
TROUBLE SWALLOWING: 0
FEVER: 0
DIARRHEA: 0

## 2025-03-18 NOTE — PATIENT INSTRUCTIONS
"Home Care Instructions for Sinusitis  Medications: Take Flonase (nasal spray) and Claritin-D as directed to reduce congestion and inflammation.  Hydration: Drink plenty of fluids to help thin mucus and promote drainage.  Nasal Rinse: Use a saline nasal spray or neti pot to keep nasal passages clear.  Rest: Get adequate rest to help your body fight the infection.  Pain Management: Take Tylenol (acetaminophen) or ibuprofen as needed for pain or fever.  Steam Therapy: Use a humidifier or inhale steam from a bowl of hot water to ease sinus pressure.  \"Wait and See\" Antibiotic: If an antibiotic was prescribed but instructed to wait, only start it if symptoms persist beyond 10 days, worsen after initial improvement, or become severe (high fever, facial swelling, severe headache).  Red Flag Symptoms - Seek Medical Attention If:  Symptoms worsen despite treatment or persist beyond 10-14 days.  High fever (>=102°F or 38.9°C).  Facial swelling or severe sinus pain.  Vision changes or severe headache unresponsive to pain medications.  Stiff neck, confusion, or difficulty breathing.  Follow-Up: See your primary care provider if symptoms persist or worsen.   "

## 2025-03-18 NOTE — PROGRESS NOTES
"Subjective   Patient ID: Missy Ocasio \"Abril" is a 44 y.o. female. They present today with a chief complaint of Sore Throat, Earache (X 1 day), and Cough (X 1 1/2 wks).    History of Present Illness  44 year old female presents with complaint of cough, sinus congestion/pressure, ear pain intermittent right, sore throat, wheezing. Symptoms started 1.5 wk ago. Has been taking Mucinex and Nyquil with mild relief.       Sore Throat   This is a new problem. The current episode started 1 to 4 weeks ago. The problem has been unchanged. Neither side of throat is experiencing more pain than the other. There has been no fever. The pain is moderate. Associated symptoms include congestion, coughing, ear pain, headaches and a plugged ear sensation. Pertinent negatives include no abdominal pain, diarrhea, drooling, ear discharge, hoarse voice, neck pain, shortness of breath, stridor, swollen glands, trouble swallowing or vomiting.   Earache   Associated symptoms include coughing, headaches, rhinorrhea and a sore throat. Pertinent negatives include no abdominal pain, diarrhea, ear discharge, neck pain, rash or vomiting.   Cough  Associated symptoms include ear pain, headaches, postnasal drip, rhinorrhea and a sore throat. Pertinent negatives include no chills, fever, rash or shortness of breath.       Past Medical History  Allergies as of 03/18/2025    (No Known Allergies)       (Not in a hospital admission)       Past Medical History:   Diagnosis Date    Abnormal Pap smear of cervix 2003    Anxiety     CTS (carpal tunnel syndrome) 1997    Depression     Endometriosis 2015    Female infertility 2013    Migraine 2009       Past Surgical History:   Procedure Laterality Date    COLPOSCOPY  2003    PELVIC LAPAROSCOPY  2015    dx of endometriosis    WISDOM TOOTH EXTRACTION          reports that she has never smoked. She has never been exposed to tobacco smoke. She has never used smokeless tobacco. She reports current alcohol use of " Chief Complaint   Patient presents with   • Follow-up     rash/eczema     Referred from:  Tianna Gomez MD / PCP:  Tianna Gomez MD     History of Present Illness:  Carlos Ochoa presents for a follow up on eczema. This has been present for months.  He is currently using clobetasol, and does not think this is helping. Using it two times a day. Feels that rash is getting worse and spreading  Previous visit biopsy showed spongiotic dermatitis.    Immunosuppressive workup was negative.     Past Dermatologic Specific History:  Spongiotic dermatitis     Family History/Social History:   He does not  have a family history of skin cancer.   reports that he quit smoking about 18 months ago. His smoking use included cigarettes. He has a 33.00 pack-year smoking history. he has never used smokeless tobacco.      Medications, the past medical and surgical history were reviewed in the electronic medical record and updated as necessary.     Review of Systems:   General:  No fevers, no chills, no unintentional weight loss.  Skin:  No other skin complaints.    Physical Examination:    Constitutional:  Well developed, well nourished, in no acute distress.    Neurologic and Psychiatric:  He has an appropriate mood and affect.  Alert and oriented x3 with no gross neurological defects.    Musculoskeletal:  Normal gait.  Ocular:  Normal eyelids and conjunctivae.   SKIN:  Examination of the chest, abdomen, arms, hands, neck, scalp and face revealed the following significant findings:     Erythematous erythematous circular scaling patches on the chest, back, extremities, forehead, temples        Assessment and Plan:  Spongiotic dermatitis  Surgical site cleansed with hydrogen peroxide and water.  Sutures removed from chest.  Incision clean, dry, intact.  Patient aware of pathology results.  Vaseline and bandaide applied.    Discussed options, will trial a course of prednisone.  Previously he had received prednisone from his PCP which  about 4.0 standard drinks of alcohol per week. She reports that she does not use drugs.    Review of Systems  Review of Systems   Constitutional:  Negative for chills, fatigue and fever.   HENT:  Positive for congestion, ear pain, postnasal drip, rhinorrhea, sinus pressure and sore throat. Negative for drooling, ear discharge, hoarse voice, sinus pain and trouble swallowing.    Respiratory:  Positive for cough. Negative for shortness of breath and stridor.    Gastrointestinal:  Negative for abdominal pain, diarrhea and vomiting.   Musculoskeletal:  Negative for neck pain.   Skin:  Negative for rash.   Neurological:  Positive for headaches. Negative for dizziness and light-headedness.   All other systems reviewed and are negative.          Objective    Vitals:    03/18/25 0958   BP: 119/84   Pulse: 84   Resp: 20   Temp: 36.4 °C (97.5 °F)   TempSrc: Temporal   SpO2: 98%     No LMP recorded. (Menstrual status: IUD).    Physical Exam  Vitals and nursing note reviewed.   Constitutional:       Appearance: Normal appearance. She is normal weight. She is not ill-appearing or toxic-appearing.   HENT:      Head: Normocephalic.      Right Ear: Tympanic membrane, ear canal and external ear normal.      Left Ear: Tympanic membrane, ear canal and external ear normal.      Nose: Congestion and rhinorrhea present.      Right Turbinates: Swollen.      Left Turbinates: Swollen.      Right Sinus: No maxillary sinus tenderness or frontal sinus tenderness.      Left Sinus: No maxillary sinus tenderness or frontal sinus tenderness.      Mouth/Throat:      Mouth: Mucous membranes are moist.      Pharynx: Oropharynx is clear. Postnasal drip present. No oropharyngeal exudate.      Tonsils: 0 on the right. 0 on the left.   Eyes:      Pupils: Pupils are equal, round, and reactive to light.   Cardiovascular:      Rate and Rhythm: Normal rate and regular rhythm.      Pulses: Normal pulses.      Heart sounds: Normal heart sounds.   Pulmonary:     caused chest pain, this is not a common adverse effect of prednisone  Alternative would be methotrexate, will do trial of prednisone first.    Continue to use topical steroids.         Carlos was seen today for follow-up.    Diagnoses and all orders for this visit:    Spongiotic dermatitis  -     predniSONE (DELTASONE) 20 MG tablet; Take 3 pills x 7 days, then 2 pills x 7 days, then 1 pill x 7 days           Return in about 2 months (around 5/4/2019) for skin rash.      On 3/4/2019, IChapis, Medical Assistant Mariaelena, scribed the services personally performed by Eber Castro MD        The documentation recorded by the mariaelena accurately and completely reflects the service(s) I personally performed and the decisions made by me.              "  Effort: Pulmonary effort is normal. No respiratory distress.      Breath sounds: Normal breath sounds. No wheezing.   Musculoskeletal:         General: Normal range of motion.      Cervical back: Normal range of motion and neck supple.   Lymphadenopathy:      Cervical: No cervical adenopathy.   Skin:     General: Skin is warm.   Neurological:      Mental Status: She is alert and oriented to person, place, and time.   Psychiatric:         Mood and Affect: Mood normal.         Behavior: Behavior normal.         Procedures    Point of Care Test & Imaging Results from this visit  No results found for this visit on 03/18/25.   No results found.    Diagnostic study results (if any) were reviewed by ALPA Kaur.    Assessment/Plan   Allergies, medications, history, and pertinent labs/EKGs/Imaging reviewed by ALPA Kaur.     Medical Decision Making  History and examination consistent with acute uncomplicated sinusitis. Discussed viral vs bacterial etiology at this time. Pt declined labs or imaging at this time. No evidence of sepsis, strep pharyngitis, or pneumonia. Counseled patient/family on \"wait and see\" antibiotic treatment and supportive measures at home. Patient advised to return to clinic or present to ED if symptoms change or worsen. Otherwise follow-up with PCP. Patient verbalized understanding and agreeable with plan of care.      Orders and Diagnoses  There are no diagnoses linked to this encounter.    Medical Admin Record      Patient disposition: Home    Electronically signed by ALPA Kaur  10:33 AM      "

## 2025-03-20 ENCOUNTER — HOSPITAL ENCOUNTER (OUTPATIENT)
Dept: RADIOLOGY | Facility: CLINIC | Age: 45
Discharge: HOME | End: 2025-03-20
Payer: COMMERCIAL

## 2025-03-20 DIAGNOSIS — N64.4 BREAST PAIN, LEFT: ICD-10-CM

## 2025-03-20 PROCEDURE — 77065 DX MAMMO INCL CAD UNI: CPT | Mod: LT

## 2025-04-17 ASSESSMENT — ENCOUNTER SYMPTOMS
RHINORRHEA: 1
PND: 1
HEMOPTYSIS: 0
NECK PAIN: 0
SHORTNESS OF BREATH: 1
SORE THROAT: 1
ORTHOPNEA: 0
HEADACHES: 0
CLAUDICATION: 0
SWOLLEN GLANDS: 1
LEG PAIN: 0
FEVER: 0
SPUTUM PRODUCTION: 1
SYNCOPE: 0
ABDOMINAL PAIN: 0
WHEEZING: 1
VOMITING: 0

## 2025-04-18 ENCOUNTER — OFFICE VISIT (OUTPATIENT)
Dept: PRIMARY CARE | Facility: CLINIC | Age: 45
End: 2025-04-18
Payer: COMMERCIAL

## 2025-04-18 VITALS
OXYGEN SATURATION: 95 % | WEIGHT: 140 LBS | BODY MASS INDEX: 24.03 KG/M2 | DIASTOLIC BLOOD PRESSURE: 80 MMHG | SYSTOLIC BLOOD PRESSURE: 102 MMHG | TEMPERATURE: 97.9 F | HEART RATE: 87 BPM

## 2025-04-18 DIAGNOSIS — J06.9 UPPER RESPIRATORY TRACT INFECTION, UNSPECIFIED TYPE: Primary | ICD-10-CM

## 2025-04-18 ASSESSMENT — ENCOUNTER SYMPTOMS
SHORTNESS OF BREATH: 1
VOMITING: 0
RHINORRHEA: 1
ABDOMINAL PAIN: 0
SWOLLEN GLANDS: 1
HEMOPTYSIS: 0
COUGH: 1
FEVER: 0
PND: 1
SINUS PAIN: 0
SORE THROAT: 1
NECK PAIN: 0
CLAUDICATION: 0
LEG PAIN: 0
SYNCOPE: 0
SPUTUM PRODUCTION: 1
SINUS PRESSURE: 0
ORTHOPNEA: 0
WHEEZING: 1
HEADACHES: 0

## 2025-04-18 ASSESSMENT — PATIENT HEALTH QUESTIONNAIRE - PHQ9
1. LITTLE INTEREST OR PLEASURE IN DOING THINGS: NOT AT ALL
SUM OF ALL RESPONSES TO PHQ9 QUESTIONS 1 AND 2: 0
2. FEELING DOWN, DEPRESSED OR HOPELESS: NOT AT ALL

## 2025-04-18 ASSESSMENT — COLUMBIA-SUICIDE SEVERITY RATING SCALE - C-SSRS
6. HAVE YOU EVER DONE ANYTHING, STARTED TO DO ANYTHING, OR PREPARED TO DO ANYTHING TO END YOUR LIFE?: NO
2. HAVE YOU ACTUALLY HAD ANY THOUGHTS OF KILLING YOURSELF?: NO
1. IN THE PAST MONTH, HAVE YOU WISHED YOU WERE DEAD OR WISHED YOU COULD GO TO SLEEP AND NOT WAKE UP?: NO

## 2025-04-18 NOTE — PROGRESS NOTES
"  Assessment/Plan   Assessment & Plan  Upper respiratory tract infection, unspecified type  Encouraged to take augmentin 875/125 mg BID for 7 days prescribed by            Subjective   Patient ID: Missy Rader" is a 44 y.o. female who presents for Cough (Post nasal drip, chest congestion, SOB, right ear feels full, sore throat, headache x's 5 weeks).  Cough  Associated symptoms include ear pain, rhinorrhea, a sore throat, shortness of breath and wheezing. Pertinent negatives include no chest pain, fever, headaches, hemoptysis or rash.   Shortness of Breath  This is a new problem. The current episode started 1 to 4 weeks ago. The problem occurs intermittently. The problem has been gradually improving. The average episode lasts 5 minutes. Associated symptoms include coryza, ear pain, PND, rhinorrhea, a sore throat, sputum production, swollen glands and wheezing. Pertinent negatives include no abdominal pain, chest pain, claudication, fever, headaches, hemoptysis, leg pain, leg swelling, neck pain, orthopnea, rash, syncope or vomiting. The symptoms are aggravated by URIs.     She has had sore throat for 5 weeks accompanied by post nasal drip, chest congestions, R ear fullness, headaches, feels like she cannot catch her breath (with minimal exertion), slight cough. She felt like symptoms were improving last week but now they have returned.   She has been using mucinex, flonase, nyquil. She was prescribed augmentin by  which she has not started yet.   Review of Systems   Constitutional:  Negative for fever.   HENT:  Positive for ear pain, rhinorrhea and sore throat. Negative for sinus pressure and sinus pain.    Respiratory:  Positive for cough, sputum production, shortness of breath and wheezing. Negative for hemoptysis.    Cardiovascular:  Positive for PND. Negative for chest pain, orthopnea, claudication, leg swelling and syncope.   Gastrointestinal:  Negative for abdominal pain and vomiting. "   Musculoskeletal:  Negative for neck pain.   Skin:  Negative for rash.   Neurological:  Negative for headaches.       Objective   Physical Exam  Constitutional:       General: She is not in acute distress.     Appearance: Normal appearance. She is not ill-appearing.   HENT:      Head: Normocephalic and atraumatic.   Eyes:      Extraocular Movements: Extraocular movements intact.   Cardiovascular:      Rate and Rhythm: Normal rate and regular rhythm.   Pulmonary:      Effort: Pulmonary effort is normal. No respiratory distress.      Breath sounds: Normal breath sounds. No stridor. No wheezing, rhonchi or rales.   Neurological:      Mental Status: She is alert.             Blaine Jalloh MD 04/18/25 10:58 AM

## 2025-04-28 ENCOUNTER — PATIENT MESSAGE (OUTPATIENT)
Dept: OBSTETRICS AND GYNECOLOGY | Facility: CLINIC | Age: 45
End: 2025-04-28
Payer: COMMERCIAL

## 2025-04-29 ENCOUNTER — TELEPHONE (OUTPATIENT)
Dept: OBSTETRICS AND GYNECOLOGY | Facility: HOSPITAL | Age: 45
End: 2025-04-29
Payer: COMMERCIAL

## 2025-04-29 NOTE — TELEPHONE ENCOUNTER
Called pt to update her on provider response to her question and symptoms concerning period abnormality and abdominal pain. Pt identified by name and . Pt verbalized understanding of providers recommendation. No additional questions or concerns voiced.

## 2025-05-16 ASSESSMENT — ENCOUNTER SYMPTOMS
FEVER: 0
HEMATURIA: 0
DIARRHEA: 0
FLATUS: 0
NAUSEA: 0
DYSURIA: 0
HEADACHES: 0
ANOREXIA: 0
MYALGIAS: 0
BELCHING: 0
VOMITING: 0
CONSTIPATION: 0
HEMATOCHEZIA: 0
FREQUENCY: 0
ARTHRALGIAS: 0
ABDOMINAL PAIN: 1
WEIGHT LOSS: 0

## 2025-05-19 ENCOUNTER — APPOINTMENT (OUTPATIENT)
Dept: OBSTETRICS AND GYNECOLOGY | Facility: CLINIC | Age: 45
End: 2025-05-19
Payer: COMMERCIAL

## 2025-05-19 ENCOUNTER — PREP FOR PROCEDURE (OUTPATIENT)
Dept: OBSTETRICS AND GYNECOLOGY | Facility: CLINIC | Age: 45
End: 2025-05-19

## 2025-05-19 VITALS
WEIGHT: 144 LBS | BODY MASS INDEX: 24.59 KG/M2 | HEIGHT: 64 IN | SYSTOLIC BLOOD PRESSURE: 124 MMHG | DIASTOLIC BLOOD PRESSURE: 82 MMHG

## 2025-05-19 DIAGNOSIS — R10.2 FEMALE PELVIC PAIN: ICD-10-CM

## 2025-05-19 DIAGNOSIS — N80.9 ENDOMETRIOSIS DETERMINED BY LAPAROSCOPY: ICD-10-CM

## 2025-05-19 DIAGNOSIS — R10.2 PELVIC PAIN: Primary | ICD-10-CM

## 2025-05-19 DIAGNOSIS — Z12.31 BREAST CANCER SCREENING BY MAMMOGRAM: ICD-10-CM

## 2025-05-19 DIAGNOSIS — N94.6 DYSMENORRHEA: Primary | ICD-10-CM

## 2025-05-19 DIAGNOSIS — N80.9 ENDOMETRIOSIS: ICD-10-CM

## 2025-05-19 DIAGNOSIS — R10.30 LOWER ABDOMINAL PAIN: ICD-10-CM

## 2025-05-19 PROCEDURE — 3008F BODY MASS INDEX DOCD: CPT | Performed by: OBSTETRICS & GYNECOLOGY

## 2025-05-19 PROCEDURE — 1036F TOBACCO NON-USER: CPT | Performed by: OBSTETRICS & GYNECOLOGY

## 2025-05-19 PROCEDURE — 99215 OFFICE O/P EST HI 40 MIN: CPT | Performed by: OBSTETRICS & GYNECOLOGY

## 2025-05-19 RX ORDER — CELECOXIB 400 MG/1
400 CAPSULE ORAL ONCE
OUTPATIENT
Start: 2025-05-19 | End: 2025-05-19

## 2025-05-19 RX ORDER — ACETAMINOPHEN 325 MG/1
975 TABLET ORAL ONCE
OUTPATIENT
Start: 2025-05-19 | End: 2025-05-19

## 2025-05-19 RX ORDER — GABAPENTIN 600 MG/1
600 TABLET ORAL ONCE
OUTPATIENT
Start: 2025-05-19 | End: 2025-05-19

## 2025-05-19 ASSESSMENT — PATIENT HEALTH QUESTIONNAIRE - PHQ9
2. FEELING DOWN, DEPRESSED OR HOPELESS: NOT AT ALL
1. LITTLE INTEREST OR PLEASURE IN DOING THINGS: NOT AT ALL
SUM OF ALL RESPONSES TO PHQ9 QUESTIONS 1 & 2: 0

## 2025-05-19 NOTE — PROGRESS NOTES
"SUBJECTIVE    44 y.o.  IUD female presents for   Chief Complaint   Patient presents with    Follow-up     Follow up      Pt presenting for follow up. She has a h/o endometriosis-- Was seen in 10/2024 for a h/o endometriosis with increasing pelvic pain/dysmenorrhea and had been started on Myfembree (pt had a copper IUD) by Haven Rodriguez.  This medication caused irritation/mood changes although her pain was improved.  At that time we swapped her to LoEstrin. She also had a pelvic ultrasound in 2024 showing a normal uterus and appropriately located IUD.  Eventually because her symptoms persisted we exchanged her copper IUD for a Mirena on 2025.    Pt reports that her menses have been 6-7 days in length, although the flow has been lighter.  In April her cycle was delayed by one week.  Throughout this time her pain was generally improved from previous.  Then, on  to , she had sharp RLQ pain in her pelvis.  LMP had been 5/2 to 5/8. Pain was intermittently worse. She then went to the ED because the pain became more consistent.  She received one does of Toradol; since then she has been taking Ibuprofen 400-600mg 2x/day.  Pain has lessened today-- was 6-7/10 at ED and now is 3/10.  She does have pain free days.    She did have a CT scan and ultrasound at [facility] on 5/15/2025 that were both normal (IUD in place). There was a simple right sided ovarian cyst.    Her last laparoscopy was in . Records are not available but perher report the volume of disease was \"mild.\"    OB/GYN History  No LMP recorded. (Menstrual status: IUD).    Social History     Substance and Sexual Activity   Sexual Activity Yes    Partners: Male    Birth control/protection: I.U.D.       OB History    Para Term  AB Living   3 2 1  1 2   SAB IAB Ectopic Multiple Live Births   1    2      # Outcome Date GA Lbr Gurwinder/2nd Weight Sex Type Anes PTL Lv   3 Para 2018     Vag-Spont   CARINA   2 Term 2016     Vag-Spont   CARINA   1 SAB " "2015               Past Medical History  She has a past medical history of Abnormal Pap smear of cervix (2003), Anxiety, CTS (carpal tunnel syndrome) (1997), Depression, Endometriosis (2015), Female infertility (2013), and Migraine (2009).    Surgical History  She has a past surgical history that includes [place] tooth extraction; Colposcopy (2003); and Pelvic laparoscopy (2015).     Social History  She reports that she has never smoked. She has never been exposed to tobacco smoke. She has never used smokeless tobacco. She reports current alcohol use of about 4.0 standard drinks of alcohol per week. She reports that she does not use drugs.    Medications:  Current Medications[1]    Screenings  Social Drivers of Health     Tobacco Use: Low Risk  (5/19/2025)    Patient History     Smoking Tobacco Use: Never     Smokeless Tobacco Use: Never     Passive Exposure: Never   Alcohol Use: Alcohol Misuse (6/4/2024)    AUDIT-C     Frequency of Alcohol Consumption: 2-3 times a week     Average Number of Drinks: 1 or 2     Frequency of Binge Drinking: Less than monthly   Financial Resource Strain: Not on file   Food Insecurity: Not on file   Transportation Needs: Not on file   Physical Activity: Not on file   Stress: No Stress Concern Present (6/4/2024)    [employer] [place] [employer] - Occupational Stress Questionnaire     Feeling of Stress : Not at all   Social Connections: Not on file   Intimate Partner Violence: Not on file   Depression: Not at risk (5/19/2025)    PHQ-2     PHQ-2 Score: 0   Housing Stability: Not on file   Utilities: Not on file   Digital Equity: Not on file   Health Literacy: Not on file         OBJECTIVE  Vitals:    05/19/25 1050   BP: 124/82   Weight: 65.3 kg (144 lb)   Height: 1.626 m (5' 4\")     Body mass index is 24.72 kg/m².     Chaperone: Present: NA  OBGyn Exam deferred for discussion    ASSESSMENT & PLAN  Problem List Items Addressed This Visit    None  Visit Diagnoses         " Dysmenorrhea    -  Primary      Endometriosis          Female pelvic pain          Lower abdominal pain          Breast cancer screening by mammogram        Relevant Orders    BI mammo bilateral screening tomosynthesis            Follow up: Mammogram ordered per request (due 6/2025).    We reviewed the nature of her symptoms since the Mirena was placed. Overall, her pain and bleeding are both somewhat improved, apart from the exacerbation she experienced last week.  Uncertain if this might have been related to ovulation or her chronic endometriosis pain.  Discussed further therapy options, including but not limited to a trial on Lupron (pt has not had previously), laparoscopy (last 2015), hysterectomy (+/- BSO).  Pt has completed child-bearing and at this point is most interested in a laparoscopy for possible evaluation of extent of disease and treatment as indicated.  Risks, benefits, and alternatives were reviewed and consent was obtained.  Will submit prep-for-procedure.    Andrzej Hickman MD  Obstetrics & Gynecology  05/19/25         [1]   Current Outpatient Medications:     DULoxetine (Cymbalta) 60 mg DR capsule, Take 1 capsule (60 mg) by mouth once daily. Do not crush or chew., Disp: 90 capsule, Rfl: 3    fluticasone (Flonase) 50 mcg/actuation nasal spray, Administer 1 spray into each nostril once daily. Shake gently. Before first use, prime pump. After use, clean tip and replace cap., Disp: 16 g, Rfl: 0    levonorgestrel (Mirena) 20 mcg/24hr IUD, 52 mg by intrauterine route 1 time., Disp: , Rfl:     propranolol (Inderal) 10 mg tablet, Take 1 tablet (10 mg) by mouth 2 times a day., Disp: 180 tablet, Rfl: 3

## 2025-05-20 ENCOUNTER — TELEPHONE (OUTPATIENT)
Dept: OPERATING ROOM | Facility: HOSPITAL | Age: 45
End: 2025-05-20
Payer: COMMERCIAL

## 2025-05-21 ENCOUNTER — TELEPHONE (OUTPATIENT)
Facility: CLINIC | Age: 45
End: 2025-05-21

## 2025-05-21 ENCOUNTER — APPOINTMENT (OUTPATIENT)
Dept: OBSTETRICS AND GYNECOLOGY | Facility: CLINIC | Age: 45
End: 2025-05-21
Payer: COMMERCIAL

## 2025-05-27 PROBLEM — N80.9 ENDOMETRIOSIS DETERMINED BY LAPAROSCOPY: Status: ACTIVE | Noted: 2025-05-19

## 2025-05-27 PROBLEM — R10.2 PELVIC PAIN: Status: ACTIVE | Noted: 2025-05-19

## 2025-06-11 ENCOUNTER — CLINICAL SUPPORT (OUTPATIENT)
Dept: PREADMISSION TESTING | Facility: HOSPITAL | Age: 45
End: 2025-06-11
Payer: COMMERCIAL

## 2025-06-11 NOTE — CPM/PAT H&P
"CPM/PAT Evaluation       Name: Missy Ocasio (Missy Ocasio \"Radha\")  /Age: 1980/44 y.o.     {[facility] Visit Type:51380}      Chief Complaint: ***    HPI    Medical History[1]    Surgical History[2]    Patient  reports being sexually active and has had partner(s) who are male. She reports using the following method of birth control/protection: I.U.D..    Family History[3]    Allergies[4]    Missy Ocasio is scheduled for EXCISION AND FULGURATION, LESION, LAPAROSCOPIC - Bilateral  on 25      **Data Input  Prior to Admission medications    Medication Sig Start Date End Date Taking? Authorizing Provider   DULoxetine (Cymbalta) 60 mg DR capsule Take 1 capsule (60 mg) by mouth once daily. Do not crush or chew. 25   Irais Marrero DO   fluticasone (Flonase) 50 mcg/actuation nasal spray Administer 1 spray into each nostril once daily. Shake gently. Before first use, prime pump. After use, clean tip and replace cap. 3/18/25 3/18/26  NEVAEH Kaur-CNP   levonorgestrel (Mirena) 20 mcg/24hr IUD 52 mg by intrauterine route 1 time.    Historical Provider, MD   propranolol (Inderal) 10 mg tablet Take 1 tablet (10 mg) by mouth 2 times a day. 25   Irais Marrero DO        PAT RUST     PAT Physical Exam     Airway    Visit Vitals  OB Status IUD   Smoking Status Never       DASI Risk Score    No data to display       Caprini DVT Assessment    No data to display       Modified Frailty Index    No data to display       [id number] Stroke Risk Points  Current as of just now        N/A 0 to 9 Points:      Last Change: N/A          The [id number] risk score (Lip REGINA, et al. 2009. © 2010 American College [employer] Chest Physicians) quantifies the risk of stroke for a patient with atrial fibrillation. For patients without atrial fibrillation or under the age of 18 this score appears as N/A. Higher score values generally indicate higher risk of stroke.        This score is not applicable to this patient. " Components are not calculated.          Revised Cardiac Risk Index    No data to display       Apfel Simplified Score    No data to display       Risk Analysis Index Results This Encounter    No data found in the last 10 encounters.       Prodigy: High Risk  Total Score: 0          ARISCAT Score for Postoperative Pulmonary Complications    No data to display       [place] Perioperative Risk for Myocardial Infarction or Cardiac Arrest (LINO)    No data to display     --Testing/Diagnostic:          - CT A/P: 05/15/25  IMPRESSION:   Appendix not identified; otherwise no definite acute abnormalities seen in the abdomen or pelvis.       - US Pelvis: 05/15/25  IMPRESSION:   IUD in place.   Small uterine fibroid cannot be excluded.       Patient Specialist/PCP:                                                                                                         PCP: Irais Marrero 02/24/25 presents for annual wellness exam. Hx of POTS, anxiety, depression      Neurology: Cassidy Chang 02/27/25 evaluation of POTS(currently resolved) reported improvement in POTS symptoms with the use of compression socks and medication, noting no new symptoms or exacerbations.   - No additional testing is deemed necessary at this follow-up.  - Schedule follow-ups as needed based on symptomatology and treatment response      ENT: Colton Azevedo 04/04/24 presents with tinnitus in the setting of TMJ. Discussed conservative tinnitus and TMJ management.   -plan for follow up in clinic as needed and after completion of ordered studies       Ob/Gyn: Andrzej Hickman 05/19/25 follow up. She has a h/o endometriosis-- Was seen in 10/2024 for a h/o endometriosis with increasing pelvic pain/dysmenorrhea and had been started on Myfembree (pt had a copper IUD) by Haven Rodriguez     CT scan and ultrasound at [facility] on 5/15/2025 that were both normal (IUD in place). There was a simple right sided ovarian cyst.          ________________________________________________________________  **Data input only. No medications, history or providers verified          Devika Maya LPN  Preadmission Testing          Assessment and Plan:     {[facility] ASSESSMENT AND PLAN:92947}             [1]   Past Medical History:  Diagnosis Date    Abnormal Pap smear of cervix 2003    Anxiety     CTS (carpal tunnel syndrome) 1997    Depression     Endometriosis 2015    Female infertility 2013    Hypertension     Migraine 2009   [2]   Past Surgical History:  Procedure Laterality Date    COLPOSCOPY  2003    PELVIC LAPAROSCOPY  2015    dx of endometriosis    WISDOM TOOTH EXTRACTION     [3]   Family History  Problem Relation Name Age of Onset    Lymphoma Mother Missy Palma     Cancer Mother Missy Palma     No Known Problems Father      Other (cardiac disorder) Maternal Grandmother Vivian Shahid     Other (cva) Maternal Grandmother Vivian Key     Stroke Maternal Grandmother Vivian Key     Other (cardiac disorder) Maternal Grandfather Fam Key     Heart disease Maternal Grandfather Fam Key     Other (malignant neoplasm) Paternal Grandfather     [4] No Known Allergies

## 2025-06-18 ENCOUNTER — PRE-ADMISSION TESTING (OUTPATIENT)
Dept: PREADMISSION TESTING | Facility: HOSPITAL | Age: 45
End: 2025-06-18
Payer: COMMERCIAL

## 2025-06-18 VITALS
BODY MASS INDEX: 24.5 KG/M2 | OXYGEN SATURATION: 98 % | SYSTOLIC BLOOD PRESSURE: 112 MMHG | HEIGHT: 64 IN | DIASTOLIC BLOOD PRESSURE: 77 MMHG | WEIGHT: 143.5 LBS | HEART RATE: 83 BPM | TEMPERATURE: 97.9 F

## 2025-06-18 DIAGNOSIS — N80.9 ENDOMETRIOSIS DETERMINED BY LAPAROSCOPY: Primary | ICD-10-CM

## 2025-06-18 LAB
ANION GAP SERPL CALC-SCNC: 10 MMOL/L (ref 10–20)
BUN SERPL-MCNC: 10 MG/DL (ref 6–23)
CALCIUM SERPL-MCNC: 9.6 MG/DL (ref 8.6–10.6)
CHLORIDE SERPL-SCNC: 101 MMOL/L (ref 98–107)
CO2 SERPL-SCNC: 33 MMOL/L (ref 21–32)
CREAT SERPL-MCNC: 0.64 MG/DL (ref 0.5–1.05)
EGFRCR SERPLBLD CKD-EPI 2021: >90 ML/MIN/1.73M*2
ERYTHROCYTE [DISTWIDTH] IN BLOOD BY AUTOMATED COUNT: 12.9 % (ref 11.5–14.5)
GLUCOSE SERPL-MCNC: 78 MG/DL (ref 74–99)
HCT VFR BLD AUTO: 42.3 % (ref 36–46)
HGB BLD-MCNC: 13.7 G/DL (ref 12–16)
MCH RBC QN AUTO: 30.3 PG (ref 26–34)
MCHC RBC AUTO-ENTMCNC: 32.4 G/DL (ref 32–36)
MCV RBC AUTO: 94 FL (ref 80–100)
NRBC BLD-RTO: 0 /100 WBCS (ref 0–0)
PLATELET # BLD AUTO: 307 X10*3/UL (ref 150–450)
POTASSIUM SERPL-SCNC: 4.7 MMOL/L (ref 3.5–5.3)
RBC # BLD AUTO: 4.52 X10*6/UL (ref 4–5.2)
SODIUM SERPL-SCNC: 139 MMOL/L (ref 136–145)
WBC # BLD AUTO: 4.6 X10*3/UL (ref 4.4–11.3)

## 2025-06-18 PROCEDURE — 80048 BASIC METABOLIC PNL TOTAL CA: CPT

## 2025-06-18 PROCEDURE — 99204 OFFICE O/P NEW MOD 45 MIN: CPT | Performed by: NURSE PRACTITIONER

## 2025-06-18 PROCEDURE — 36415 COLL VENOUS BLD VENIPUNCTURE: CPT

## 2025-06-18 PROCEDURE — 85027 COMPLETE CBC AUTOMATED: CPT

## 2025-06-18 ASSESSMENT — DUKE ACTIVITY SCORE INDEX (DASI)
TOTAL_SCORE: 58.2
CAN YOU WALK INDOORS, SUCH AS AROUND YOUR HOUSE: YES
CAN YOU WALK A BLOCK OR TWO ON LEVEL GROUND: YES
CAN YOU DO LIGHT WORK AROUND THE HOUSE LIKE DUSTING OR WASHING DISHES: YES
CAN YOU DO MODERATE WORK AROUND THE HOUSE LIKE VACUUMING, SWEEPING FLOORS OR CARRYING GROCERIES: YES
CAN YOU HAVE SEXUAL RELATIONS: YES
CAN YOU DO YARD WORK LIKE RAKING LEAVES, WEEDING OR PUSHING A MOWER: YES
CAN YOU PARTICIPATE IN STRENOUS SPORTS LIKE SWIMMING, SINGLES TENNIS, FOOTBALL, BASKETBALL, OR SKIING: YES
DASI METS SCORE: 9.9
CAN YOU TAKE CARE OF YOURSELF (EAT, DRESS, BATHE, OR USE TOILET): YES
CAN YOU DO HEAVY WORK AROUND THE HOUSE LIKE SCRUBBING FLOORS OR LIFTING AND MOVING HEAVY FURNITURE: YES
CAN YOU PARTICIPATE IN MODERATE RECREATIONAL ACTIVITIES LIKE GOLF, BOWLING, DANCING, DOUBLES TENNIS OR THROWING A BASEBALL OR FOOTBALL: YES
CAN YOU RUN A SHORT DISTANCE: YES
CAN YOU CLIMB A FLIGHT OF STAIRS OR WALK UP A HILL: YES

## 2025-06-18 ASSESSMENT — ENCOUNTER SYMPTOMS
MUSCULOSKELETAL NEGATIVE: 1
GASTROINTESTINAL NEGATIVE: 1
RESPIRATORY NEGATIVE: 1
ENDOCRINE NEGATIVE: 1
CARDIOVASCULAR NEGATIVE: 1
CONSTITUTIONAL NEGATIVE: 1
NECK NEGATIVE: 1
NEUROLOGICAL NEGATIVE: 1

## 2025-06-18 ASSESSMENT — LIFESTYLE VARIABLES: SMOKING_STATUS: NONSMOKER

## 2025-06-18 NOTE — PREPROCEDURE INSTRUCTIONS
Fasting Guidelines    NPO Instructions:    With sedation, food or liquid in your stomach can enter your lungs causing serious complications  Increases nausea and vomiting    When do I need to stop eating and drinking before my surgery?  Do not eat any food after midnight the night before your surgery/procedure  You may have up to 13.5 ounces of clear liquids until TWO hours before your instructed arrival time to the hospital. This includes water, black tea/coffee (no milk or creamer), apple juice, and electrolyte replacement beverages (Gatorade)  You may chew gum until TWO hours before your surgery/procedure    Additional Instructions:     Avoid herbal supplements, multivitamins and NSAIDS (non-steroidal anti-inflammatory drugs) such as Advil, Aleve, Ibuprofen, Naproxen, Excedrin, Meloxicam or Celebrex for at least 7 days prior to surgery. May take Tylenol as needed.    Avoid tobacco and alcohol products for 24 hours prior to surgery.    CONTACT SURGEON'S OFFICE IF YOU DEVELOP:  * Fever = 100.4 F   * New respiratory symptoms (e.g. cough, shortness of breath, respiratory distress, sore throat)  * Recent loss of taste or smell  *Flu like symptoms such as headache, fatigue or gastrointestinal symptoms  * You develop any open sores, shingles, burning or painful urination   AND/OR:  * You no longer wish to have the surgery.  * Any other personal circumstances change that may lead to the need to cancel or defer this surgery.  *You were admitted to any hospital within one week of your planned procedure.    Seven/Six Days before Surgery:  Review your medication instructions, stop indicated medications    Day of Surgery:  Review your medication instructions, take indicated medications  Wear comfortable loose fitting clothing  Do not use moisturizers, creams, lotions or perfume  All jewelry and valuables should be left at home    Soraida Fields [facility]  [facility]  Aomrz-[phone]  [phone]  Email-[email or web address]              Preoperative Brain Exercises    What are brain exercises?  A brain exercise is any activity that engages your thinking (cognitive) skills.    What types of activities are considered brain exercises?  Jigsaw puzzles, crossword puzzles, word jumble, memory games, word search, and many more.  Many can be found free online or on your phone via a mobile darryl.    Why should I do brain exercises before my surgery?  More recent research has shown brain exercise before surgery can lower the risk of postoperative delirium (confusion) which can be especially important for older adults.  Patients who did brain exercises for 5 to 10 hours the days before surgery, cut their risk of postoperative delirium in half up to 1 week after surgery.         [facility]    Preoperative Deep Breathing Exercises    Why it is important to do deep breathing exercises before my surgery?  Deep breathing exercises strengthen your breathing muscles.  This helps you to recover after your surgery and decreases the chance of breathing complications.      How are the deep breathing exercises done?  Sit straight with your back supported.  Breathe in deeply and slowly through your nose. Your lower rib cage should expand and your abdomen may move forward.  Hold that breath for 3 to 5 seconds.  Breathe out through pursed lips, slowly and completely.  Rest and repeat 10 times every hour while awake.  Rest longer if you become dizzy or lightheaded.         Patient and Family Education             Ways You Can Help Prevent Blood Clots             This handout explains some simple things you can do to help prevent blood clots.      Blood clots are blockages that can form in the body's veins. When a blood clot forms in your deep veins, it may be called a deep vein thrombosis, or DVT for short. Blood clots can happen in any part of the  body where blood flows, but they are most common in the arms and legs. If a piece of a blood clot breaks free and travels to the lungs, it is called a pulmonary embolus (PE). A PE can be a very serious problem.         Being in the hospital or having surgery can raise your chances of getting a blood clot because you may not be well enough to move around as much as you normally do.         Ways you can help prevent blood clots in the hospital         Wearing SCDs. SCDs stands for Sequential Compression Devices.   SCDs are special sleeves that wrap around your legs  They attach to a pump that fills them with air to gently squeeze your legs every few minutes.   This helps return the blood in your legs to your heart.   SCDs should only be taken off when walking or bathing.   SCDs may not be comfortable, but they can help save your life.               Wearing compression stockings - if your doctor orders them. These special snug fitting stockings gently squeeze your legs to help blood flow.       Walking. Walking helps move the blood in your legs.   If your doctor says it is ok, try walking the halls at least   5 times a day. Ask us to help you get up, so you don't fall.      Taking any blood thinning medicines your doctor orders.        Page 1 of 2     [facility]; 3/23   Ways you can help prevent blood clots at home       Wearing compression stockings - if your doctor orders them. ? Walking - to help move the blood in your legs.       Taking any blood thinning medicines your doctor orders.      Signs of a blood clot or PE      Tell your doctor or nurse know right away if you have of the problems listed below.    If you are at home, seek medical care right away. Call 911 for chest pain or problems breathing.               Signs of a blood clot (DVT) - such as pain,  swelling, redness or warmth in your arm or leg      Signs of a pulmonary embolism (PE) - such as chest     pain or feeling short of breath

## 2025-06-18 NOTE — CPM/PAT H&P
"CPM/PAT Evaluation       Name: Missy Ocasio (Missy Ocasio \"Radha\")  /Age: 1980/44 y.o.     Visit Type:   In-Person       Chief Complaint: perioperative evaluation      HPI  The patient is a 44 year old female with history of endometriosis and increasing pelvic pain/dysmenorrhea. She had recent pelvic CT with simple right ovarian cyst. She has failed medical management and wishes to proceed with more definitive treatment. She is referred today for perioperative evaluation in anticipation of excision and fulguration, lesion, laparoscopic-bilateral on 7/3/25.,     Medical History[1]    Surgical History[2]    Patient  reports being sexually active and has had partner(s) who are male. She reports using the following method of birth control/protection: I.U.D..    Family History[3]    Allergies[4]      Prior to Admission medications    Medication Sig Start Date End Date Taking? Authorizing Provider   DULoxetine (Cymbalta) 60 mg DR capsule Take 1 capsule (60 mg) by mouth once daily. Do not crush or chew. 25   Irais Marrero DO   fluticasone (Flonase) 50 mcg/actuation nasal spray Administer 1 spray into each nostril once daily. Shake gently. Before first use, prime pump. After use, clean tip and replace cap. 3/18/25 3/18/26  ALPA Kaur   levonorgestrel (Mirena) 20 mcg/24hr IUD 52 mg by intrauterine route 1 time.    Historical Provider, MD   propranolol (Inderal) 10 mg tablet Take 1 tablet (10 mg) by mouth 2 times a day. 25   Irais Marrero DO        PAT ROS:   Constitutional:   neg    Neuro/Psych:   neg    Eyes:    use of corrective lenses  Ears:   neg    Nose:   neg    Mouth:   neg    Throat:   neg    Neck:   neg    Cardio:   neg    Respiratory:   neg    Endocrine:   neg    GI:   neg    :   neg    Musculoskeletal:   neg    Hematologic:   neg    Skin:  neg        Physical Exam  Vitals reviewed.   Constitutional:       Appearance: Normal appearance.   HENT:      " "Head: Normocephalic and atraumatic.      Nose: Nose normal.      Mouth/Throat:      Mouth: Mucous membranes are moist.      Pharynx: Oropharynx is clear.   Eyes:      Extraocular Movements: Extraocular movements intact.      Conjunctiva/sclera: Conjunctivae normal.      Pupils: Pupils are equal, round, and reactive to light.   Cardiovascular:      Rate and Rhythm: Normal rate and regular rhythm.      Pulses: Normal pulses.      Heart sounds: Normal heart sounds.   Pulmonary:      Effort: Pulmonary effort is normal.      Breath sounds: Normal breath sounds.   Musculoskeletal:         General: Normal range of motion.      Cervical back: Normal range of motion.   Skin:     General: Skin is warm and dry.   Neurological:      General: No focal deficit present.      Mental Status: She is alert and oriented to person, place, and time.   Psychiatric:         Mood and Affect: Mood normal.         Behavior: Behavior normal.          PAT AIRWAY:   Airway:     Mallampati::  I    TM distance::  >3 FB    Neck ROM::  Full  normal        Visit Vitals  /77   Pulse 83   Temp 36.6 °C (97.9 °F)   Ht 1.626 m (5' 4\")   Wt 65.1 kg (143 lb 8 oz)   SpO2 98%   BMI 24.63 kg/m²   OB Status IUD   Smoking Status Never   BSA 1.71 m²       DASI Risk Score      Flowsheet Row Pre-Admission Testing from 6/18/2025 in [facility] Questionnaire Series Submission from 6/11/2025 in [facility] OR with Generic Provider Mychart   Can you take care of yourself (eat, dress, bathe, or use toilet)?  2.75 filed at 06/18/2025 1354 2.75  filed at 06/11/2025 1020   Can you walk indoors, such as around your house? 1.75 filed at 06/18/2025 1354 1.75  filed at 06/11/2025 1020   Can you walk a block or two on level ground?  2.75 filed at 06/18/2025 1354 2.75  filed at 06/11/2025 1020   Can you climb a flight of stairs or walk up a hill? 5.5 filed at 06/18/2025 1354 5.5  filed at 06/11/2025 1020   Can you run a short distance? " 8 filed at 06/18/2025 1354 8  filed at 06/11/2025 1020   Can you do light work around the house like dusting or washing dishes? 2.7 filed at 06/18/2025 1354 2.7  filed at 06/11/2025 1020   Can you do moderate work around the house like vacuuming, sweeping floors or carrying groceries? 3.5 filed at 06/18/2025 1354 3.5  filed at 06/11/2025 1020   Can you do heavy work around the house like scrubbing floors or lifting and moving heavy furniture?  8 filed at 06/18/2025 1354 8  filed at 06/11/2025 1020   Can you do yard work like raking leaves, weeding or pushing a mower? 4.5 filed at 06/18/2025 1354 4.5  filed at 06/11/2025 1020   Can you have sexual relations? 5.25 filed at 06/18/2025 1354 5.25  filed at 06/11/2025 1020   Can you participate in moderate recreational activities like golf, bowling, dancing, doubles tennis or throwing a baseball or football? 6 filed at 06/18/2025 1354 6  filed at 06/11/2025 1020   Can you participate in strenous sports like swimming, singles tennis, football, basketball, or skiing? 7.5 filed at 06/18/2025 1354 7.5  filed at 06/11/2025 1020   DASI SCORE 58.2 filed at 06/18/2025 1354 58.2  filed at 06/11/2025 1020   METS Score (Will be calculated only when all the questions are answered) 9.9 filed at 06/18/2025 1354 9.9  filed at 06/11/2025 1020          Marci DVT Assessment      Flowsheet Row Pre-Admission Testing from 6/18/2025 in [facility] [place]   DVT Score (IF A SCORE IS NOT CALCULATING, MUST SELECT A BMI TO COMPLETE) 3 filed at 06/18/2025 1417   Surgical Factors Minor surgery planned filed at 06/18/2025 1417   BMI (BMI MUST BE CHOSEN) 30 or less filed at 06/18/2025 1417          Modified Frailty Index    No data to display       [id number] Stroke Risk Points  Current as of just now        N/A 0 to 9 Points:      Last Change: N/A          The [id number] risk score (Lip REGINA, et al. 2009. © 2010 American College [employer]) quantifies the risk of stroke for a  patient with atrial fibrillation. For patients without atrial fibrillation or under the age of 18 this score appears as N/A. Higher score values generally indicate higher risk of stroke.        This score is not applicable to this patient. Components are not calculated.          Revised Cardiac Risk Index      Flowsheet Row Pre-Admission Testing from 6/18/2025 in [facility]   High-Risk Surgery (Intraperitoneal, Intrathoracic,Suprainguinal vascular) 0 filed at 06/18/2025 1419   History of ischemic heart disease (History of MI, History of positive exercuse test, Current chest paint considered due to myocardial ischemia, Use of nitrate therapy, ECG with pathological Q Waves) 0 filed at 06/18/2025 1419   History of congestive heart failure (pulmonary edemia, bilateral rales or S3 gallop, Paroxysmal nocturnal dyspnea, CXR showing pulmonary vascular redistribution) 0 filed at 06/18/2025 1419   History of cerebrovascular disease (Prior TIA or stroke) 0 filed at 06/18/2025 1419   Pre-operative insulin treatment 0 filed at 06/18/2025 1419   Pre-operative creatinine>2 mg/dl 0 filed at 06/18/2025 1419   Revised Cardiac Risk Calculator 0 filed at 06/18/2025 1419          Apfel Simplified Score      Flowsheet Row Pre-Admission Testing from 6/18/2025 in [facility]   Smoking status 1 filed at 06/18/2025 1408   History of motion sickness or PONV  1 filed at 06/18/2025 1408   Use of postoperative opioids 1 filed at 06/18/2025 1408   Gender - Female 1=Yes filed at 06/18/2025 1408   Apfel Simplified Score Calculator 4 filed at 06/18/2025 1408          Risk Analysis Index Results This Encounter    No data found in the last 10 encounters.       Stop Bang Score      Flowsheet Row Pre-Admission Testing from 6/18/2025 in [facility] Questionnaire Series Submission from 6/11/2025 in [facility] MOS OR with Generic Provider Deenat   Do you snore loudly? 0 filed at 06/18/2025  1353 0  filed at 06/11/2025 1020   Do you often feel tired or fatigued after your sleep? 0 filed at 06/18/2025 1353 0  filed at 06/11/2025 1020   Has anyone ever observed you stop breathing in your sleep? 0 filed at 06/18/2025 1353 0  filed at 06/11/2025 1020   Do you have or are you being treated for high blood pressure? 0 filed at 06/18/2025 1353 0  filed at 06/11/2025 1020   Recent BMI (Calculated) 24.7 filed at 06/18/2025 1353 24.7  filed at 06/11/2025 1020   Is BMI greater than 35 kg/m2? 0=No filed at 06/18/2025 1353 0=No  filed at 06/11/2025 1020   Age older than 50 years old? 0=No filed at 06/18/2025 1353 0=No  filed at 06/11/2025 1020   Is your neck circumference greater than 17 inches (Male) or 16 inches (Female)? 0 filed at 06/18/2025 1353 --   Gender - Male 0=No filed at 06/18/2025 1353 0=No  filed at 06/11/2025 1020   STOP-BANG Total Score 0 filed at 06/18/2025 1353 --          Prodigy: High Risk  Total Score: 0          ARISCAT Score for Postoperative Pulmonary Complications      Flowsheet Row Pre-Admission Testing from 6/18/2025 in [facility]   Age Calculated Score 0 filed at 06/18/2025 1419   Preoperative SpO2 0 filed at 06/18/2025 1419   Respiratory infection in the last month Either upper or lower (i.e., URI, bronchitis, pneumonia), with fever and antibiotic treatment 0 filed at 06/18/2025 1419   Preoperative anemia (Hgb less than 10 g/dl) 0 filed at 06/18/2025 1419   Surgical incision  0 filed at 06/18/2025 1419   Duration of surgery  0 filed at 06/18/2025 1419   Emergency Procedure  0 filed at 06/18/2025 1419   ARISCAT Total Score  0 filed at 06/18/2025 1419          [place] Perioperative Risk for Myocardial Infarction or Cardiac Arrest (LINO)    No data to display       Recent Results (from the past 2 weeks)   CBC    Collection Time: 06/18/25  2:15 PM   Result Value Ref Range    WBC 4.6 4.4 - 11.3 x10*3/uL    nRBC 0.0 0.0 - 0.0 /100 WBCs    RBC 4.52 4.00 - 5.20 x10*6/uL     Hemoglobin 13.7 12.0 - 16.0 g/dL    Hematocrit 42.3 36.0 - 46.0 %    MCV 94 80 - 100 fL    MCH 30.3 26.0 - 34.0 pg    MCHC 32.4 32.0 - 36.0 g/dL    RDW 12.9 11.5 - 14.5 %    Platelets 307 150 - 450 x10*3/uL   Basic Metabolic Panel    Collection Time: 06/18/25  2:15 PM   Result Value Ref Range    Glucose 78 74 - 99 mg/dL    Sodium 139 136 - 145 mmol/L    Potassium 4.7 3.5 - 5.3 mmol/L    Chloride 101 98 - 107 mmol/L    Bicarbonate 33 (H) 21 - 32 mmol/L    Anion Gap 10 10 - 20 mmol/L    Urea Nitrogen 10 6 - 23 mg/dL    Creatinine 0.64 0.50 - 1.05 mg/dL    eGFR >90 >60 mL/min/1.73m*2    Calcium 9.6 8.6 - 10.6 mg/dL        --Testing/Diagnostic:      - CT A/P: 05/15/25  IMPRESSION:   Appendix not identified; otherwise no definite acute abnormalities seen in the abdomen or pelvis.     - US Pelvis: 05/15/25  IMPRESSION:   IUD in place.   Small uterine fibroid cannot be excluded.     Patient Specialist/PCP:                                                                                                         PCP: Irais Marrero 02/24/25 presents for annual wellness exam. Hx of POTS, anxiety, depression    Neurology: Cassidy Chang 02/27/25 evaluation of POTS(currently resolved) reported improvement in POTS symptoms with the use of compression socks and medication, noting no new symptoms or exacerbations.   - No additional testing is deemed necessary at this follow-up.  - Schedule follow-ups as needed based on symptomatology and treatment response    ENT: Colton Azevedo 04/04/24 presents with tinnitus in the setting of TMJ. Discussed conservative tinnitus and TMJ management.   -plan for follow up in clinic as needed and after completion of ordered studies     Ob/Gyn: Andrzej Hickman 05/19/25 follow up. She has a h/o endometriosis-- Was seen in 10/2024 for a h/o endometriosis with increasing pelvic pain/dysmenorrhea and had been started on Myfembree (pt had a copper IUD) by Haven Rodriguez     CT scan and [facility] at [facility] on 5/15/2025  that were both normal (IUD in place). There was a simple right sided ovarian cyst.       Assessment and Plan:     Anesthesia  The patient denies problems with anesthesia in the past such as PONV, prolonged sedation, awareness, dental damage, aspiration, cardiac arrest, difficult intubation, or unexpected hospital admissions.     Airway  No documented or reported history of airway difficulty.     Neurology  The patient has anxiety, depression, POTs currently managed on medication. No current neurological complaints. The patient is at increased risk for postoperative delirium secondary to depression. The patient is at increased risk for perioperative stroke secondary to female gender, general anesthesia. Handouts for preoperative brain exercises given to patient.    HEENT  No diagnoses or significant findings on chart review or clinical presentation and evaluation.    Cardiovascular  No diagnoses or significant findings on chart review or clinical presentation and evaluation.    METS  The patient's functional capacity is greater than 4 METS.  RCRI  The patient meets 0 RCRI criteria and therefor has a 3.9% risk of major adverse cardiac complications.  LINO score which indicates a 0% risk of intraoperative or 30-day postoperative MACE (major adverse cardiac event).    Cardiology Evaluation  The patient is not followed by cardiology.    She is scheduled for non-cardiac surgery associated with elevated risk. The patient has no major cardiac contraindications to non- cardiac surgery.    Pulmonary  The patient has history of URI 4/18 treated with 7 day course of Augmentin. No acute respiratory complaints.     STOP BANG 0, which places patient at low risk for having ISHA.  ARISCAT 0, low, 1.6% risk of in-hospital postoperative pulmonary complications  PRODIGY 0, low risk of respiratory depression episode.     Patient given PI sheet for preoperative deep breathing exercises.  Encourage  incentive spirometry in the  postoperative period as deemed necessary.    Endocrine  No diagnoses or significant findings on chart review or clinical presentation and evaluation.    Gastrointestinal  No diagnoses or significant findings on chart review or clinical presentation and evaluation.    Eat 10- 0,  self-perceived oropharyngeal dysphagia scale (0-40)     Genitourinary  The patient has history ofendometriosis, dysmenorrhea, scheduled for surgery with Dr. Hickman on 7/3/25.    Renal  No renal diagnoses or significant findings on chart review or clinical presentation and evaluation.    Hematology  No diagnoses or significant findings on chart review or clinical presentation and evaluation.    Caprini score 3, low risk of perioperative VTE.     Patient instructed to ambulate as soon as possible postoperatively to decrease thromboembolic risk. Initiate mechanical DVT prophylaxis as soon as possible and initiate chemical prophylaxis when deemed safe from a bleeding standpoint post surgery.     Transfusion Evaluation  A type and screen was obtained given the likelihood for perioperative transfusion of blood or blood products.    Musculoskeletal  No diagnoses or significant findings on chart review or clinical presentation and evaluation.    ID  No diagnoses or significant findings on chart review or clinical presentation and evaluation.    -Preoperative medication instructions were provided and reviewed with the patient.  Any additional testing or evaluation was explained to the patient.  NPO Instructions were discussed, and the patient's questions were answered prior to conclusion of this encounter. Patient verbalized understanding of preoperative instructions. After Visit Summary given.               [1]   Past Medical History:  Diagnosis Date    Abnormal Pap smear of cervix 2003    Anxiety     CTS (carpal tunnel syndrome) 1997    Depression     Dysfunctional uterine bleeding     Dysmenorrhea     Endometriosis     seen by Dr. Andrzej Hickman  05/19/25    Female infertility 2013    Migraine 2009    POTS (postural orthostatic tachycardia syndrome)     managed with Propranolol    Temporomandibular joint disorder     Tinnitus for as long as i can remember   [2]   Past Surgical History:  Procedure Laterality Date    COLPOSCOPY  2003    EXPLORATORY LAPAROTOMY  2015    PELVIC LAPAROSCOPY  2015    dx of endometriosis    WISDOM TOOTH EXTRACTION     [3]   Family History  Problem Relation Name Age of Onset    Lymphoma Mother Missy Palma     Cancer Mother Missy Palma     No Known Problems Father      Other (cardiac disorder) Maternal Grandmother Vivian Key     Other (cva) Maternal Grandmother Vivian Key     Stroke Maternal Grandmother Vivian Key     Other (cardiac disorder) Maternal Grandfather Fam Key     Heart disease Maternal Grandfather Fam Key     Other (malignant neoplasm) Paternal Grandfather     [4] No Known Allergies

## 2025-06-25 ENCOUNTER — APPOINTMENT (OUTPATIENT)
Dept: RADIOLOGY | Facility: CLINIC | Age: 45
End: 2025-06-25
Payer: COMMERCIAL

## 2025-06-27 ENCOUNTER — APPOINTMENT (OUTPATIENT)
Dept: RADIOLOGY | Facility: CLINIC | Age: 45
End: 2025-06-27
Payer: COMMERCIAL

## 2025-06-27 VITALS — BODY MASS INDEX: 24.75 KG/M2 | WEIGHT: 145 LBS | HEIGHT: 64 IN

## 2025-06-27 DIAGNOSIS — Z12.31 BREAST CANCER SCREENING BY MAMMOGRAM: ICD-10-CM

## 2025-06-27 PROCEDURE — 77063 BREAST TOMOSYNTHESIS BI: CPT

## 2025-07-03 ENCOUNTER — ANESTHESIA EVENT (OUTPATIENT)
Dept: OPERATING ROOM | Facility: HOSPITAL | Age: 45
End: 2025-07-03
Payer: COMMERCIAL

## 2025-07-03 ENCOUNTER — ANESTHESIA (OUTPATIENT)
Dept: OPERATING ROOM | Facility: HOSPITAL | Age: 45
End: 2025-07-03
Payer: COMMERCIAL

## 2025-07-03 ENCOUNTER — HOSPITAL ENCOUNTER (OUTPATIENT)
Facility: HOSPITAL | Age: 45
Setting detail: OUTPATIENT SURGERY
Discharge: HOME | End: 2025-07-03
Attending: OBSTETRICS & GYNECOLOGY | Admitting: OBSTETRICS & GYNECOLOGY
Payer: COMMERCIAL

## 2025-07-03 VITALS
BODY MASS INDEX: 24.65 KG/M2 | HEIGHT: 64 IN | OXYGEN SATURATION: 100 % | RESPIRATION RATE: 14 BRPM | SYSTOLIC BLOOD PRESSURE: 105 MMHG | TEMPERATURE: 97.2 F | DIASTOLIC BLOOD PRESSURE: 69 MMHG | WEIGHT: 144.4 LBS | HEART RATE: 80 BPM

## 2025-07-03 DIAGNOSIS — Z98.890 POST-OPERATIVE STATE: ICD-10-CM

## 2025-07-03 DIAGNOSIS — N80.9 ENDOMETRIOSIS DETERMINED BY LAPAROSCOPY: ICD-10-CM

## 2025-07-03 DIAGNOSIS — R10.2 PELVIC PAIN: Primary | ICD-10-CM

## 2025-07-03 PROBLEM — Z86.69 HISTORY OF MIGRAINE HEADACHES: Status: ACTIVE | Noted: 2025-07-03

## 2025-07-03 LAB — PREGNANCY TEST URINE, POC: NEGATIVE

## 2025-07-03 PROCEDURE — 3600000003 HC OR TIME - INITIAL BASE CHARGE - PROCEDURE LEVEL THREE: Performed by: OBSTETRICS & GYNECOLOGY

## 2025-07-03 PROCEDURE — 2500000001 HC RX 250 WO HCPCS SELF ADMINISTERED DRUGS (ALT 637 FOR MEDICARE OP): Performed by: OBSTETRICS & GYNECOLOGY

## 2025-07-03 PROCEDURE — 88341 IMHCHEM/IMCYTCHM EA ADD ANTB: CPT | Performed by: STUDENT IN AN ORGANIZED HEALTH CARE EDUCATION/TRAINING PROGRAM

## 2025-07-03 PROCEDURE — 2500000004 HC RX 250 GENERAL PHARMACY W/ HCPCS (ALT 636 FOR OP/ED)

## 2025-07-03 PROCEDURE — 3600000009 HC OR TIME - EACH INCREMENTAL 1 MINUTE - PROCEDURE LEVEL FOUR: Performed by: OBSTETRICS & GYNECOLOGY

## 2025-07-03 PROCEDURE — 88342 IMHCHEM/IMCYTCHM 1ST ANTB: CPT | Performed by: STUDENT IN AN ORGANIZED HEALTH CARE EDUCATION/TRAINING PROGRAM

## 2025-07-03 PROCEDURE — 81025 URINE PREGNANCY TEST: CPT | Performed by: OBSTETRICS & GYNECOLOGY

## 2025-07-03 PROCEDURE — 3700000002 HC GENERAL ANESTHESIA TIME - EACH INCREMENTAL 1 MINUTE: Performed by: OBSTETRICS & GYNECOLOGY

## 2025-07-03 PROCEDURE — 88305 TISSUE EXAM BY PATHOLOGIST: CPT | Mod: TC,SUR | Performed by: OBSTETRICS & GYNECOLOGY

## 2025-07-03 PROCEDURE — 7100000001 HC RECOVERY ROOM TIME - INITIAL BASE CHARGE: Performed by: OBSTETRICS & GYNECOLOGY

## 2025-07-03 PROCEDURE — 96372 THER/PROPH/DIAG INJ SC/IM: CPT

## 2025-07-03 PROCEDURE — 3600000004 HC OR TIME - INITIAL BASE CHARGE - PROCEDURE LEVEL FOUR: Performed by: OBSTETRICS & GYNECOLOGY

## 2025-07-03 PROCEDURE — 2720000007 HC OR 272 NO HCPCS: Performed by: OBSTETRICS & GYNECOLOGY

## 2025-07-03 PROCEDURE — 7100000002 HC RECOVERY ROOM TIME - EACH INCREMENTAL 1 MINUTE: Performed by: OBSTETRICS & GYNECOLOGY

## 2025-07-03 PROCEDURE — A58662 PR LAP,FULGURATE/EXCISE LESIONS: Performed by: ANESTHESIOLOGY

## 2025-07-03 PROCEDURE — 2500000004 HC RX 250 GENERAL PHARMACY W/ HCPCS (ALT 636 FOR OP/ED): Performed by: OBSTETRICS & GYNECOLOGY

## 2025-07-03 PROCEDURE — 2500000005 HC RX 250 GENERAL PHARMACY W/O HCPCS: Performed by: OBSTETRICS & GYNECOLOGY

## 2025-07-03 PROCEDURE — 7100000009 HC PHASE TWO TIME - INITIAL BASE CHARGE: Performed by: OBSTETRICS & GYNECOLOGY

## 2025-07-03 PROCEDURE — 88305 TISSUE EXAM BY PATHOLOGIST: CPT | Performed by: STUDENT IN AN ORGANIZED HEALTH CARE EDUCATION/TRAINING PROGRAM

## 2025-07-03 PROCEDURE — 3700000001 HC GENERAL ANESTHESIA TIME - INITIAL BASE CHARGE: Performed by: OBSTETRICS & GYNECOLOGY

## 2025-07-03 PROCEDURE — 58662 LAPAROSCOPY EXCISE LESIONS: CPT | Performed by: OBSTETRICS & GYNECOLOGY

## 2025-07-03 PROCEDURE — A58662 PR LAP,FULGURATE/EXCISE LESIONS

## 2025-07-03 PROCEDURE — 3600000008 HC OR TIME - EACH INCREMENTAL 1 MINUTE - PROCEDURE LEVEL THREE: Performed by: OBSTETRICS & GYNECOLOGY

## 2025-07-03 PROCEDURE — 7100000010 HC PHASE TWO TIME - EACH INCREMENTAL 1 MINUTE: Performed by: OBSTETRICS & GYNECOLOGY

## 2025-07-03 RX ORDER — HYDROMORPHONE HYDROCHLORIDE 0.2 MG/ML
0.2 INJECTION INTRAMUSCULAR; INTRAVENOUS; SUBCUTANEOUS EVERY 5 MIN PRN
Status: DISCONTINUED | OUTPATIENT
Start: 2025-07-03 | End: 2025-07-03 | Stop reason: HOSPADM

## 2025-07-03 RX ORDER — ONDANSETRON HYDROCHLORIDE 2 MG/ML
INJECTION, SOLUTION INTRAVENOUS AS NEEDED
Status: DISCONTINUED | OUTPATIENT
Start: 2025-07-03 | End: 2025-07-03

## 2025-07-03 RX ORDER — BUPIVACAINE HYDROCHLORIDE 5 MG/ML
INJECTION, SOLUTION PERINEURAL AS NEEDED
Status: DISCONTINUED | OUTPATIENT
Start: 2025-07-03 | End: 2025-07-03 | Stop reason: HOSPADM

## 2025-07-03 RX ORDER — OXYCODONE HYDROCHLORIDE 5 MG/1
5 TABLET ORAL EVERY 6 HOURS PRN
Qty: 12 TABLET | Refills: 0 | Status: SHIPPED | OUTPATIENT
Start: 2025-07-03

## 2025-07-03 RX ORDER — PROPOFOL 10 MG/ML
INJECTION, EMULSION INTRAVENOUS AS NEEDED
Status: DISCONTINUED | OUTPATIENT
Start: 2025-07-03 | End: 2025-07-03

## 2025-07-03 RX ORDER — LIDOCAINE HCL/PF 100 MG/5ML
SYRINGE (ML) INTRAVENOUS AS NEEDED
Status: DISCONTINUED | OUTPATIENT
Start: 2025-07-03 | End: 2025-07-03

## 2025-07-03 RX ORDER — ACETAMINOPHEN 325 MG/1
975 TABLET ORAL EVERY 6 HOURS PRN
Qty: 30 TABLET | Refills: 1 | Status: SHIPPED | OUTPATIENT
Start: 2025-07-03

## 2025-07-03 RX ORDER — DROPERIDOL 2.5 MG/ML
0.62 INJECTION, SOLUTION INTRAMUSCULAR; INTRAVENOUS ONCE AS NEEDED
Status: DISCONTINUED | OUTPATIENT
Start: 2025-07-03 | End: 2025-07-03 | Stop reason: HOSPADM

## 2025-07-03 RX ORDER — SODIUM CHLORIDE 0.9 G/100ML
INJECTION, SOLUTION IRRIGATION AS NEEDED
Status: DISCONTINUED | OUTPATIENT
Start: 2025-07-03 | End: 2025-07-03 | Stop reason: HOSPADM

## 2025-07-03 RX ORDER — ACETAMINOPHEN 325 MG/1
975 TABLET ORAL ONCE
Status: COMPLETED | OUTPATIENT
Start: 2025-07-03 | End: 2025-07-03

## 2025-07-03 RX ORDER — FENTANYL CITRATE 50 UG/ML
INJECTION, SOLUTION INTRAMUSCULAR; INTRAVENOUS AS NEEDED
Status: DISCONTINUED | OUTPATIENT
Start: 2025-07-03 | End: 2025-07-03

## 2025-07-03 RX ORDER — CELECOXIB 200 MG/1
400 CAPSULE ORAL ONCE
Status: COMPLETED | OUTPATIENT
Start: 2025-07-03 | End: 2025-07-03

## 2025-07-03 RX ORDER — ROCURONIUM BROMIDE 10 MG/ML
INJECTION, SOLUTION INTRAVENOUS AS NEEDED
Status: DISCONTINUED | OUTPATIENT
Start: 2025-07-03 | End: 2025-07-03

## 2025-07-03 RX ORDER — WATER 1 ML/ML
INJECTION IRRIGATION AS NEEDED
Status: DISCONTINUED | OUTPATIENT
Start: 2025-07-03 | End: 2025-07-03 | Stop reason: HOSPADM

## 2025-07-03 RX ORDER — IBUPROFEN 600 MG/1
600 TABLET, FILM COATED ORAL EVERY 6 HOURS PRN
Qty: 30 TABLET | Refills: 1 | Status: SHIPPED | OUTPATIENT
Start: 2025-07-03

## 2025-07-03 RX ORDER — DROPERIDOL 2.5 MG/ML
INJECTION, SOLUTION INTRAMUSCULAR; INTRAVENOUS AS NEEDED
Status: DISCONTINUED | OUTPATIENT
Start: 2025-07-03 | End: 2025-07-03

## 2025-07-03 RX ORDER — ONDANSETRON HYDROCHLORIDE 2 MG/ML
4 INJECTION, SOLUTION INTRAVENOUS ONCE AS NEEDED
Status: DISCONTINUED | OUTPATIENT
Start: 2025-07-03 | End: 2025-07-03 | Stop reason: HOSPADM

## 2025-07-03 RX ORDER — SODIUM CHLORIDE, SODIUM LACTATE, POTASSIUM CHLORIDE, CALCIUM CHLORIDE 600; 310; 30; 20 MG/100ML; MG/100ML; MG/100ML; MG/100ML
INJECTION, SOLUTION INTRAVENOUS CONTINUOUS PRN
Status: DISCONTINUED | OUTPATIENT
Start: 2025-07-03 | End: 2025-07-03

## 2025-07-03 RX ORDER — MIDAZOLAM HYDROCHLORIDE 1 MG/ML
INJECTION INTRAMUSCULAR; INTRAVENOUS AS NEEDED
Status: DISCONTINUED | OUTPATIENT
Start: 2025-07-03 | End: 2025-07-03

## 2025-07-03 RX ORDER — GLYCOPYRROLATE 0.2 MG/ML
INJECTION INTRAMUSCULAR; INTRAVENOUS AS NEEDED
Status: DISCONTINUED | OUTPATIENT
Start: 2025-07-03 | End: 2025-07-03

## 2025-07-03 RX ORDER — KETOROLAC TROMETHAMINE 30 MG/ML
INJECTION, SOLUTION INTRAMUSCULAR; INTRAVENOUS AS NEEDED
Status: DISCONTINUED | OUTPATIENT
Start: 2025-07-03 | End: 2025-07-03

## 2025-07-03 RX ORDER — ONDANSETRON 4 MG/1
4 TABLET, FILM COATED ORAL EVERY 6 HOURS PRN
Qty: 20 TABLET | Refills: 0 | Status: SHIPPED | OUTPATIENT
Start: 2025-07-03

## 2025-07-03 RX ORDER — SODIUM CHLORIDE, SODIUM LACTATE, POTASSIUM CHLORIDE, CALCIUM CHLORIDE 600; 310; 30; 20 MG/100ML; MG/100ML; MG/100ML; MG/100ML
100 INJECTION, SOLUTION INTRAVENOUS CONTINUOUS
Status: ACTIVE | OUTPATIENT
Start: 2025-07-03 | End: 2025-07-03

## 2025-07-03 RX ORDER — GABAPENTIN 600 MG/1
600 TABLET ORAL ONCE
Status: COMPLETED | OUTPATIENT
Start: 2025-07-03 | End: 2025-07-03

## 2025-07-03 RX ADMIN — SODIUM CHLORIDE, POTASSIUM CHLORIDE, SODIUM LACTATE AND CALCIUM CHLORIDE: 600; 310; 30; 20 INJECTION, SOLUTION INTRAVENOUS at 12:14

## 2025-07-03 RX ADMIN — LIDOCAINE HYDROCHLORIDE 50 MG: 20 INJECTION, SOLUTION INTRAVENOUS at 12:20

## 2025-07-03 RX ADMIN — GLYCOPYRROLATE 0.2 MG: 0.2 INJECTION, SOLUTION INTRAMUSCULAR; INTRAVENOUS at 12:43

## 2025-07-03 RX ADMIN — DROPERIDOL 0.62 MG: 2.5 INJECTION, SOLUTION INTRAMUSCULAR; INTRAVENOUS at 12:55

## 2025-07-03 RX ADMIN — ROCURONIUM BROMIDE 20 MG: 10 INJECTION, SOLUTION INTRAVENOUS at 12:48

## 2025-07-03 RX ADMIN — SUGAMMADEX 200 MG: 100 INJECTION, SOLUTION INTRAVENOUS at 13:39

## 2025-07-03 RX ADMIN — DEXAMETHASONE SODIUM PHOSPHATE 6 MG: 4 INJECTION INTRA-ARTICULAR; INTRALESIONAL; INTRAMUSCULAR; INTRAVENOUS; SOFT TISSUE at 12:39

## 2025-07-03 RX ADMIN — KETOROLAC TROMETHAMINE 30 MG: 30 INJECTION, SOLUTION INTRAMUSCULAR; INTRAVENOUS at 13:26

## 2025-07-03 RX ADMIN — FENTANYL CITRATE 50 MCG: 50 INJECTION, SOLUTION INTRAMUSCULAR; INTRAVENOUS at 12:20

## 2025-07-03 RX ADMIN — PROPOFOL 150 MG: 10 INJECTION, EMULSION INTRAVENOUS at 12:20

## 2025-07-03 RX ADMIN — ACETAMINOPHEN 975 MG: 325 TABLET ORAL at 10:30

## 2025-07-03 RX ADMIN — PROPOFOL 30 MCG/KG/MIN: 10 INJECTION, EMULSION INTRAVENOUS at 12:37

## 2025-07-03 RX ADMIN — GABAPENTIN 600 MG: 600 TABLET, FILM COATED ORAL at 10:30

## 2025-07-03 RX ADMIN — MIDAZOLAM HYDROCHLORIDE 2 MG: 2 INJECTION, SOLUTION INTRAMUSCULAR; INTRAVENOUS at 12:15

## 2025-07-03 RX ADMIN — CELECOXIB 400 MG: 200 CAPSULE ORAL at 10:30

## 2025-07-03 RX ADMIN — ONDANSETRON 4 MG: 2 INJECTION, SOLUTION INTRAMUSCULAR; INTRAVENOUS at 13:20

## 2025-07-03 RX ADMIN — ROCURONIUM BROMIDE 50 MG: 10 INJECTION, SOLUTION INTRAVENOUS at 12:21

## 2025-07-03 RX ADMIN — FENTANYL CITRATE 50 MCG: 50 INJECTION, SOLUTION INTRAMUSCULAR; INTRAVENOUS at 12:46

## 2025-07-03 ASSESSMENT — PAIN SCALES - GENERAL
PAINLEVEL_OUTOF10: 2
PAINLEVEL_OUTOF10: 0 - NO PAIN
PAINLEVEL_OUTOF10: 2
PAINLEVEL_OUTOF10: 2

## 2025-07-03 ASSESSMENT — PAIN - FUNCTIONAL ASSESSMENT
PAIN_FUNCTIONAL_ASSESSMENT: 0-10

## 2025-07-03 ASSESSMENT — COLUMBIA-SUICIDE SEVERITY RATING SCALE - C-SSRS
2. HAVE YOU ACTUALLY HAD ANY THOUGHTS OF KILLING YOURSELF?: NO
1. IN THE PAST MONTH, HAVE YOU WISHED YOU WERE DEAD OR WISHED YOU COULD GO TO SLEEP AND NOT WAKE UP?: NO
6. HAVE YOU EVER DONE ANYTHING, STARTED TO DO ANYTHING, OR PREPARED TO DO ANYTHING TO END YOUR LIFE?: NO

## 2025-07-03 NOTE — ANESTHESIA POSTPROCEDURE EVALUATION
"Patient: Missy Ocasio \"Radha\"    Procedure Summary       Date: 07/03/25 Room / Location: [facility] MOS OR 02 / Virtual [facility] OR    Anesthesia Start: 1216 Anesthesia Stop: 1352    Procedure: EXCISION AND FULGURATION, LESION, LAPAROSCOPIC (Bilateral) Diagnosis:       Pelvic pain      Endometriosis determined by laparoscopy      (Pelvic pain [R10.2])      (Endometriosis determined by laparoscopy [N80.9])    Surgeons: Andrzej Hickman MD Responsible Provider: Derek Reyes MD    Anesthesia Type: general ASA Status: Not recorded            Anesthesia Type: general    Vitals Value Taken Time   BP 99/68 07/03/25 13:52   Temp 36.2 07/03/25 13:52   Pulse 65 07/03/25 13:50   Resp 29 07/03/25 13:50   SpO2 100 % 07/03/25 13:50   Vitals shown include unfiled device data.    Anesthesia Post Evaluation    Patient location during evaluation: PACU  Patient participation: complete - patient participated  Level of consciousness: awake and alert  Pain management: satisfactory to patient  Airway patency: patent  Two or more strategies used to mitigate risk of obstructive sleep apnea  Cardiovascular status: acceptable and blood pressure returned to baseline  Respiratory status: acceptable and face mask  Hydration status: acceptable  Postoperative Nausea and Vomiting: none        There were no known notable events for this encounter.    "

## 2025-07-03 NOTE — ANESTHESIA PROCEDURE NOTES
Airway  Date/Time: 7/3/2025 12:23 PM  Reason: elective    Airway not difficult    Staffing  Performed: CRNA   Authorized by: Derek Reyes MD    Performed by: NEVAEH Shine-ANIA  Patient location during procedure: OR    Patient Condition  Indications for airway management: anesthesia and airway protection  Patient position: sniffing  MILS maintained throughout  Planned trial extubation  Sedation level: deep     Final Airway Details   Preoxygenated: yes  Final airway type: endotracheal airway  Successful airway: ETT  Cuffed: yes   Successful intubation technique: direct laryngoscopy  Adjuncts used in placement: intubating stylet  Endotracheal tube insertion site: oral  Blade: Ben  Blade size: #3  ETT size (mm): 7.0  Cormack-Lehane Classification: grade I - full view of glottis  Placement verified by: chest auscultation and capnometry   Cuff volume (mL): 7  Measured from: lips  ETT to lips (cm): 21  Number of attempts at approach: 1

## 2025-07-03 NOTE — H&P
"Gynecologic Surgery History and Physical     Missy Ocasio is a 44 y.o. F presenting for laparoscopic excision and fulguration of endometriosis.      Past Medical History:   Diagnosis Date    Abnormal Pap smear of cervix 2003    Anxiety     CTS (carpal tunnel syndrome) 1997    Depression     Dysfunctional uterine bleeding     Dysmenorrhea     Endometriosis     seen by Dr. Andrzej Hickman 05/19/25    Female infertility 2013    Joint pain     Migraine 2009    POTS (postural orthostatic tachycardia syndrome)     managed with Propranolol    Scoliosis     Temporomandibular joint disorder     Tinnitus for as long as i can remember        Past Surgical History:   Procedure Laterality Date    COLPOSCOPY  2003    EXPLORATORY LAPAROTOMY  2015    OTHER SURGICAL HISTORY  2017    Anal fissure repair    PELVIC LAPAROSCOPY  2015    dx of endometriosis    WISDOM TOOTH EXTRACTION          Objective  /78   Pulse 63   Temp 36.2 °C (97.2 °F) (Temporal)   Resp 16   Ht 1.626 m (5' 4\")   Wt 65.5 kg (144 lb 6.4 oz)   SpO2 100%   BMI 24.79 kg/m²      Physical exam:  General: no acute distress  HEENT: normocephalic, atraumatic  CV: warm and well perfused  Lungs: breathing comfortably on room air  Abdomen: soft, non-tender  Extremities: moving all extremities spontaneously  Neuro: awake and conversant  Psych: appropriate mood and affect      Labs  No results found for this or any previous visit (from the past 24 hours).     Relevant Imaging     CT Abdomen & Pelvis (5/15)  Pelvis: No mass or ascites.  An IUD is visualized in the uterus.   IMPRESSION:   Appendix not identified; otherwise no definite acute abnormalities seen   in the abdomen or pelvis.     TVUS (5/15)  MPRESSION:   IUD in place.     Small uterine fibroid cannot be excluded.     Assessment & Plan   - Will proceed with scheduled surgery   - Consent signed 5/19      Seen and d/w Dr. Marylou Centeno MD  PGY-III, Obstetrics & Gynecology   [facility] " [facility]

## 2025-07-03 NOTE — OP NOTE
"EXCISION AND FULGURATION, LESION, LAPAROSCOPIC (B) Operative Note     Date: 7/3/2025  OR Location: [facility] OR    Name: Missy Ocasio \"Radha\", : 1980, Age: 44 y.o., MRN: [id number], Sex: female    Diagnosis  Pre-op Diagnosis      * Pelvic pain [R10.2]     * Endometriosis determined by laparoscopy [N80.9] Post-op Diagnosis     * Pelvic pain [R10.2]     * Endometriosis determined by laparoscopy [N80.9]     Procedures  EXCISION AND FULGURATION, LESION, LAPAROSCOPIC  54508 - AK LAPS FULG/EXC OVARY VISCERA/PERITONEAL SURFACE      Surgeons      * Andrzej Hickman - Primary    Resident/Fellow/Other Assistant:  Surgeons and Role:     * Jessica Centeno MD - Resident - Assisting    Staff:   Circulator: Paris  Scrub Person: Mercy  Scrub Person: Lobar  Relief Circulator: Shae  Relief Scrub: Hedy  Relief Scrub: Alida    Anesthesia Staff: Anesthesiologist: Derek Reyes MD  CRNA: NEVAEH Shine-CRNA    Procedure Summary  Anesthesia: General  ASA: II  Estimated Blood Loss: 5mL  Intra-op Medications:   Administrations occurring from 1135 to 1325 on 25:   Medication Name Total Dose   sodium chloride 0.9 % irrigation solution 1,000 mL   surgical lubricant gel 1 Application   sterile water irrigation solution 1,000 mL   dexAMETHasone (Decadron) 4 mg/mL IV Syringe 2 mL 6 mg   dexmedeTOMIDine (Precedex) bolus from bag 12 mcg   droperidol (Inapsine) injection 0.625 mg   fentaNYL (Sublimaze) injection 50 mcg/mL 100 mcg   glycopyrrolate (Robinul) injection 0.2 mg   LR infusion Cannot be calculated   lidocaine (cardiac) injection 2% prefilled syringe 50 mg   midazolam PF (Versed) injection 1 mg/mL 2 mg   ondansetron (Zofran) 2 mg/mL injection 4 mg   propofol (Diprivan) injection 10 mg/mL 234.5 mg   rocuronium (ZeMuron) 50 mg/5 mL injection 70 mg              Anesthesia Record               Intraprocedure I/O Totals          Intake    Dexmedetomidine 0.00 mL    The total shown is the total volume documented " "since Anesthesia Start was filed.    LR infusion 700.00 mL    Autologous Blood 0 mL    Cell Saver 0 mL    Total Intake 700 mL       Output    Urine 300 mL    Est. Blood Loss 5 mL    NG/OG Tube Output 0 mL    Other 0 mL    Total Output 305 mL       Net    Net Volume 395 mL          Specimen:   ID Type Source Tests Collected by Time   1 : LEFT PERITONEUM BIOPSY Tissue PERITONEUM BIOPSY SURGICAL PATHOLOGY EXAM Andrzej Hickman MD 7/3/2025 1313           Drains and/or Catheters:   [REMOVED] Urethral Catheter 16 Fr. (Removed)       Indications: Missy Ocasio \"Radha\" is an 44 y.o. female who is having surgery for Pelvic pain [R10.2]  Endometriosis determined by laparoscopy [N80.9].     Findings: Grossly normal-appearing uterus, bilateral fallopian tubes, and ovaries with scant, punctate lesions suspicious for endometrial implants.    Procedure Details:   The patient was seen in the preoperative area. The risks, benefits, complications, treatment options, non-operative alternatives, expected recovery and outcomes were discussed with the patient. The possibilities of reaction to medication, pulmonary aspiration, injury to surrounding structures, bleeding, recurrent infection, the need for additional procedures, failure to diagnose a condition, and creating a complication requiring transfusion or operation were discussed with the patient. The patient concurred with the proposed plan, giving informed consent.  The site of surgery was properly noted/marked if necessary per policy. The patient has been actively warmed in preoperative area. Preoperative antibiotics are not indicated. Venous thrombosis prophylaxis have been ordered including bilateral sequential compression devices    After informed consent was obtained, the patient was taken to the operating room and general anesthesia was administered. She was then positioned in the dorsal lithotomy position and prepped and draped in the normal sterile fashion. A perez " catheter was placed. A speculum was then placed in the vagina. The cervix was visualized and the anterior lip of the cervix was grasped with the single tooth tenaculum. The hulka uterine manipulator was placed in the cervix and the speculum was removed.      Next, attention was then turned to the abdomen. A 1 cm incision was made at the base of the umbilicus and the abdominal cavity was entered via direct optical entry technique. Initial survey of the abdomen found it to be devoid of trauma from entry. Two additional 5 mm trocar ports were placed in the LLQ and RLQ under direct visualization.     A general survey of the abdomen was conducted and notable for the above findings. Attention was first turned to a punctate lesion in the left ovarian fossa. The sidewall peritoneum was gently grasped while monopolar laparoscopic scissors were used to resect the lesion. Oozing peritoneal edges were coagulated using electrocautery. Good hemostasis was noted. A similar-appearing lesion was found on the left anterior cul-de-sac which was ablated using electrocautery. This technique was repeated on the final lesion identified in the right ovarian fossa. A general survey was again conducted with no further lesions suspicious for endometrial implants identified. Good hemostasis was noted throughout.     At this point, all ports were removed from the abdomen. The CO2 gas was disconnected and the abdomen was desufflated. The two lateral ports were first closed with a deep subcutaneous layer. All laparoscopic skin incisions were closed with a 4-0 Vicryl in a subcuticular interrupted fashion. Steri-strips were placed. At the end of the procedure, the uterine manipulator and Puckett were removed from the pelvis. The patient tolerated the procedure well. All counts were correct. The patient was taken to recovery in stable condition.       Complications:  None; patient tolerated the procedure well.    Disposition: PACU - hemodynamically  stable.  Condition: stable     Dr. Hickman was present for the entire procedure,     ZULLY Centeno MD  PGY-III, Obstetrics & Gynecology   [facility]

## 2025-07-03 NOTE — HOSPITAL COURSE
Gynecologic Surgery History and Physical     Missy Ocasio is a 44 y.o. F presenting for laparoscopic excision and fulguration of endometriosis.      Past Medical History:   Diagnosis Date    Abnormal Pap smear of cervix 2003    Anxiety     CTS (carpal tunnel syndrome) 1997    Depression     Dysfunctional uterine bleeding     Dysmenorrhea     Endometriosis     seen by Dr. Andrzej Hickman 05/19/25    Female infertility 2013    Joint pain     Migraine 2009    POTS (postural orthostatic tachycardia syndrome)     managed with Propranolol    Scoliosis     Temporomandibular joint disorder     Tinnitus for as long as i can remember        Past Surgical History:   Procedure Laterality Date    COLPOSCOPY  2003    EXPLORATORY LAPAROTOMY  2015    OTHER SURGICAL HISTORY  2017    Anal fissure repair    PELVIC LAPAROSCOPY  2015    dx of endometriosis    WISDOM TOOTH EXTRACTION          Objective  There were no vitals taken for this visit.     Physical exam:  General: no acute distress  HEENT: normocephalic, atraumatic  CV: warm and well perfused  Lungs: breathing comfortably on room air  Abdomen: soft, non-tender  Extremities: moving all extremities spontaneously  Neuro: awake and conversant  Psych: appropriate mood and affect      Labs  No results found for this or any previous visit (from the past 24 hours).     Relevant Imaging     CT Abdomen & Pelvis (5/15)  Pelvis: No mass or ascites.  An IUD is visualized in the uterus.   IMPRESSION:   Appendix not identified; otherwise no definite acute abnormalities seen   in the abdomen or pelvis.     TVUS (5/15)  MPRESSION:   IUD in place.     Small uterine fibroid cannot be excluded.     Assessment & Plan   - Will proceed with scheduled surgery   - Consent signed 5/19      Seen and d/w Dr. Marylou Centeno MD  PGY-III, Obstetrics & Gynecology   [facility]

## 2025-07-03 NOTE — ANESTHESIA PREPROCEDURE EVALUATION
"Patient: Missy Ocasio \"Radha\"    Procedure Information       Anesthesia Start Date/Time: 07/03/25 1216    Procedure: EXCISION AND FULGURATION, LESION, LAPAROSCOPIC (Bilateral) - [facility] please    Location: [facility] OR 02 / Virtual [facility] OR    Surgeons: Andrzej Hickman MD            Relevant Problems   Anesthesia (within normal limits)      Cardiac (within normal limits)      Pulmonary (within normal limits)      Neuro   (+) Anxiety   (+) History of migraine headaches      /Renal (within normal limits)      Liver (within normal limits)      Endocrine (within normal limits)      GYN   (+) Endometriosis determined by laparoscopy   Pt is NPO after midnight.      Clinical information reviewed:   Tobacco  Allergies  Meds   Med Hx  Surg Hx  OB Status  Fam Hx  Soc   Hx        NPO Detail:  NPO/Void Status  Date of Last Liquid: 07/03/25  Time of Last Liquid: 0800  Date of Last Solid: 07/02/25  Time of Last Solid: 2100  Last Intake Type: Clear fluids  Time of Last Void: 1000         Physical Exam    Airway  Mallampati: II  TM distance: >3 FB  Neck ROM: full     Cardiovascular - normal exam  Rhythm: regular  Rate: normal     Dental - normal exam     Pulmonary - normal exam   Abdominal            Anesthesia Plan    History of general anesthesia?: yes  History of complications of general anesthesia?: no    ASA 2     general     intravenous induction   Postoperative pain plan includes opioids.  Anesthetic plan and risks discussed with patient.  Use of blood products discussed with patient who consented to blood products.    Plan discussed with CAA and attending.      "

## 2025-07-03 NOTE — DISCHARGE INSTRUCTIONS
Laparoscopic Discharge Instructions  If you have any questions about your care, please contact us at [phone].    Medications and Pain Management  Common areas of pain after laparoscopic surgery include the incision pain, pain in between your shoulder blades, the pelvis and lower back. The gas that was used to distend your abdomen for the surgery is absorbed slowly into your blood stream over the first 3-4 days after surgery. It is not passed intestinally, although, because your abdomen is distended, it may feel similar to intestinal gas. Staying active and walking is the best way to promote the absorption of this gas.  Immediately after surgery, nerve pain is the most intense, typically for the first 6 to 12 hours. As the body heals, it creates inflammation around the incisions sites adding pressure and creating soreness. After 5 days, the inflammation begins to recede and significant improvement in soreness is expected. Pulling on the incisions, especially if sudden, such as when you cough, will reactivate the nerve pain. Support your abdomen with a pillow during coughing or sneezing as this will be helpful to minimize pain. There are two types of pain pills typically used for post-operative pain management, narcotics such as tramadol and an anti-inflammatory such as Ibuprofen or Naprosyn.  Taking regular anti-inflammatory pills, such as 600mg of Ibuprofen every 6 hours for the first 5 days and then as needed is recommended. You can alternate ibuprofen with tylenol (975mg or 1000mg). The tylenol can be taken every 6 hours.  If you have problems using NSAIDs, be sure to discuss this with your doctor. The narcotic can be used on a schedule for the first 1 to 2 days but after that, only as you need it. Narcotics can cause constipation, nausea, sleepiness and headaches. You may begin your usual home medications as you were taking before unless directed by your doctor.    Incisional care  Paper tape  steri-strips are typically used for the abdominal incisions. The steri-strips will fall off on their own or can be removed after one week, this is easiest to do in the shower. You may shower and use a mild soap around the incisions and pat dry. Do not use a washcloth or scrub the incisions. Using peroxide or antiseptics is not recommended for routine care. Avoid hot and steamy showers as this may cause you to feel faint. No tub baths for six weeks following surgery. There may be discoloration or bruising around the incisions. This is normal and may take several weeks to resolve. Firmness or a nodular area under the skin near the incision may represent a collection of blood, this too will resolve on its own after a little time. If any incision develops tenderness, redness in the skin layer or has drainage please call the office.    GI Function, Nausea and Constipation  Nausea can occasionally be an issue in the first few days after surgery. It is usually caused as a side effect from the anesthesia and pain medicine, particularly narcotics. Taking the pain pills with food is a food way to proactively minimize this. Throwing up, especially after the first day, is not expected and if this happens, you should call your doctor. Feeling gassy and constipation can be a problem for the first week after surgery. Limiting the use of narcotics may be helpful. Stool softeners twice a day and a high fiber diet are safe. If needed, Miralax once daily is a good choice. If no bowel movement after 3 days, you will need to increase the Miralax until soft, regular bowel movements are passing.    Urinating  Because the bladder is disturbed by the surgery, the normal sensation may be temporarily altered. You may not be aware that your bladder is full. If the bladder is allowed to get over distended, it may make the problem worse. This is why we make sure that you are able to empty your bladder adequately before you go home. For the first  few days at home, you should make a point to empty your bladder every 3 to 4 hours. Pain with voiding, especially after the first day, is not expected and may represent a bladder infection.    Activity  For the first two days post-operatively, your soreness and recovery from anesthesia will limit your activity  Stairs are safe, just take your time  At a minimum during this time, you should walk around for 10-15 minutes every 2-3 hours. After that, in the first week, any activity except for overt exercise is safe.   During the first week you should not commit to being on your feet for more than 30 minutes at a time.   During the second week, light exercise is encouraged.  After 2 weeks from surgery, you should try to get back into regular activity.   For healing, please limit the amount of weight lifted to 8-10 pounds (a gallon of milk) for the first 2 weeks after surgery.   Driving is usually okay after the first few days. You must be able to comfortably wear a seatbelt, press the gas/brake pedals, and drive defensively. You may not drive while taking narcotic pain medicines.    When to Call the Doctor  Call for any fever above 100.4 F (If you do not feel feverish you do not have to routinely check your temperature.)  Call for severe pain not improved by medications  Call for persistent nausea, vomiting  Call for vaginal bleeding that is heavy as a period or passing blood clots larger than a quarter  Call for unusual swelling in your legs  Call if the incisions develop painful redness and discharge    In an emergency, call 911 or go to an Emergency Department at a nearby hospital

## 2025-07-14 LAB
LAB AP ASR DISCLAIMER: NORMAL
LABORATORY COMMENT REPORT: NORMAL
PATH REPORT.FINAL DX SPEC: NORMAL
PATH REPORT.GROSS SPEC: NORMAL
PATH REPORT.RELEVANT HX SPEC: NORMAL
PATH REPORT.TOTAL CANCER: NORMAL

## 2025-07-16 ENCOUNTER — APPOINTMENT (OUTPATIENT)
Facility: CLINIC | Age: 45
End: 2025-07-16
Payer: COMMERCIAL

## 2025-07-16 VITALS
BODY MASS INDEX: 24.17 KG/M2 | DIASTOLIC BLOOD PRESSURE: 70 MMHG | WEIGHT: 141.6 LBS | HEIGHT: 64 IN | SYSTOLIC BLOOD PRESSURE: 108 MMHG

## 2025-07-16 DIAGNOSIS — N80.9 ENDOMETRIOSIS DETERMINED BY LAPAROSCOPY: Primary | ICD-10-CM

## 2025-07-16 PROCEDURE — 99024 POSTOP FOLLOW-UP VISIT: CPT | Performed by: OBSTETRICS & GYNECOLOGY

## 2025-07-16 PROCEDURE — 3008F BODY MASS INDEX DOCD: CPT | Performed by: OBSTETRICS & GYNECOLOGY

## 2025-07-16 PROCEDURE — 1036F TOBACCO NON-USER: CPT | Performed by: OBSTETRICS & GYNECOLOGY

## 2025-07-16 ASSESSMENT — ENCOUNTER SYMPTOMS
DEPRESSION: 0
OCCASIONAL FEELINGS OF UNSTEADINESS: 0
LOSS OF SENSATION IN FEET: 0

## 2025-07-16 NOTE — PROGRESS NOTES
"  07/16/25 Gynecology Visit    Missy Ocasio is a 44 y.o. status post diagnostic laparoscopy for pelvic pain and history of endometriosis.  Several implants suspicious for endometriosis were ablated and a bx of one lesion did return as endometriosis.    Overall recovering well, meeting all milestones. She did have some chest pain and visited the ED with a negative work up 2d after the surgery. Feeling better today.    PMHx, PSHx, SHx, Allergies, and Medications updated in Epic.    ROS: reviewed and negative    OBJECTIVE  Vitals:    07/16/25 1100   BP: 108/70   Weight: 64.2 kg (141 lb 9.6 oz)   Height: 1.626 m (5' 4\")     Body mass index is 24.31 kg/m².     Chaperone: Present: NA  OBGyn Exam Abdomen soft, NT. Incisions C/D/I; small suture remnant clipped from RLQ incision.    Assessment/Plan:     Missy Ocasio is a 44 y.o. status post laparoscopy with treatment of endometriosis  - Recovering well, no concerns  - Path reviewed, benign; photos reviewed; will monitor symptoms moving forward.    Andrzej Hickman MD  Division of General Obstetrics and Gynecology  [facility]   "

## 2026-02-24 ENCOUNTER — APPOINTMENT (OUTPATIENT)
Dept: PRIMARY CARE | Facility: CLINIC | Age: 46
End: 2026-02-24
Payer: COMMERCIAL

## (undated) DEVICE — HOLSTER, JET SAFETY

## (undated) DEVICE — TOWEL, SURGICAL, NEURO, O/R, 16 X 26, BLUE, STERILE

## (undated) DEVICE — SUTURE, VICRYL, 3-0, 27 IN, SH, VIOLET

## (undated) DEVICE — SUTURE, VICRYL, 0, 27 IN, UR-6, VIOLET

## (undated) DEVICE — PREP TRAY, SKIN, DRY, W/GLOVES

## (undated) DEVICE — CARE KIT, LAPAROSCOPIC, ADVANCED

## (undated) DEVICE — TUBE SET, PNEUMOCLEAR, SMOKE EVACU, HIGH-FLOW

## (undated) DEVICE — SYRINGE, 60 CC, LUER LOCK, MONOJECT, W/O CAP, LF

## (undated) DEVICE — COVER, CART, 45 X 27 X 48 IN, CLEAR

## (undated) DEVICE — MANIFOLD, 4 PORT NEPTUNE STANDARD

## (undated) DEVICE — REST, HEAD, BAGEL, 9 IN

## (undated) DEVICE — SUTURE, VICRYL 0, TAPER POINT, CT-1 VIOLET 27 INCH

## (undated) DEVICE — SUTURE, MONOCRYL, 4-0, 18 IN, PS2, UNDYED

## (undated) DEVICE — DRESSING, ADHESIVE, ISLAND, TELFA, 4 X 10 IN

## (undated) DEVICE — GOWN, SURGICAL, SMARTGOWN, XLARGE, STERILE

## (undated) DEVICE — TUBE, SALEM SUMP, 16 FR X 48IN, ENFIT

## (undated) DEVICE — Device

## (undated) DEVICE — TROCAR SYSTEM, BALLOON, KII GELPORT, 12 X 100MM

## (undated) DEVICE — APPLICATOR, CHLORAPREP, W/ORANGE TINT, 26ML